# Patient Record
Sex: MALE | Race: WHITE | URBAN - METROPOLITAN AREA
[De-identification: names, ages, dates, MRNs, and addresses within clinical notes are randomized per-mention and may not be internally consistent; named-entity substitution may affect disease eponyms.]

---

## 2017-03-15 ENCOUNTER — OFFICE VISIT (OUTPATIENT)
Dept: FAMILY MEDICINE | Facility: CLINIC | Age: 37
End: 2017-03-15
Payer: COMMERCIAL

## 2017-03-15 ENCOUNTER — OFFICE VISIT (OUTPATIENT)
Dept: BEHAVIORAL HEALTH | Facility: CLINIC | Age: 37
End: 2017-03-15
Payer: COMMERCIAL

## 2017-03-15 VITALS
TEMPERATURE: 98.5 F | HEART RATE: 59 BPM | HEIGHT: 71 IN | BODY MASS INDEX: 24.22 KG/M2 | SYSTOLIC BLOOD PRESSURE: 120 MMHG | DIASTOLIC BLOOD PRESSURE: 71 MMHG | WEIGHT: 173 LBS

## 2017-03-15 DIAGNOSIS — F33.1 MODERATE EPISODE OF RECURRENT MAJOR DEPRESSIVE DISORDER (H): Primary | ICD-10-CM

## 2017-03-15 DIAGNOSIS — F33.1 MAJOR DEPRESSIVE DISORDER, RECURRENT EPISODE, MODERATE (H): Primary | ICD-10-CM

## 2017-03-15 PROCEDURE — 99203 OFFICE O/P NEW LOW 30 MIN: CPT | Performed by: INTERNAL MEDICINE

## 2017-03-15 PROCEDURE — 90791 PSYCH DIAGNOSTIC EVALUATION: CPT | Performed by: SOCIAL WORKER

## 2017-03-15 RX ORDER — LANOLIN ALCOHOL/MO/W.PET/CERES
3 CREAM (GRAM) TOPICAL
COMMUNITY
Start: 2017-03-15

## 2017-03-15 RX ORDER — ESCITALOPRAM OXALATE 10 MG/1
10 TABLET ORAL DAILY
Qty: 90 TABLET | Refills: 1 | Status: SHIPPED | OUTPATIENT
Start: 2017-03-15 | End: 2017-04-12

## 2017-03-15 ASSESSMENT — ANXIETY QUESTIONNAIRES
7. FEELING AFRAID AS IF SOMETHING AWFUL MIGHT HAPPEN: SEVERAL DAYS
2. NOT BEING ABLE TO STOP OR CONTROL WORRYING: MORE THAN HALF THE DAYS
3. WORRYING TOO MUCH ABOUT DIFFERENT THINGS: MORE THAN HALF THE DAYS
IF YOU CHECKED OFF ANY PROBLEMS ON THIS QUESTIONNAIRE, HOW DIFFICULT HAVE THESE PROBLEMS MADE IT FOR YOU TO DO YOUR WORK, TAKE CARE OF THINGS AT HOME, OR GET ALONG WITH OTHER PEOPLE: SOMEWHAT DIFFICULT
GAD7 TOTAL SCORE: 9
1. FEELING NERVOUS, ANXIOUS, OR ON EDGE: SEVERAL DAYS
5. BEING SO RESTLESS THAT IT IS HARD TO SIT STILL: SEVERAL DAYS
6. BECOMING EASILY ANNOYED OR IRRITABLE: SEVERAL DAYS

## 2017-03-15 ASSESSMENT — PATIENT HEALTH QUESTIONNAIRE - PHQ9: 5. POOR APPETITE OR OVEREATING: SEVERAL DAYS
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
## 2017-03-15 NOTE — NURSING NOTE
"Chief Complaint   Patient presents with     Depression     follow up after seeing Margaret San       Initial /71 (BP Location: Right arm, Patient Position: Chair, Cuff Size: Adult Regular)  Pulse 59  Temp 98.5  F (36.9  C) (Tympanic)  Ht 5' 11\" (1.803 m)  Wt 173 lb (78.5 kg)  BMI 24.13 kg/m2 Estimated body mass index is 24.13 kg/(m^2) as calculated from the following:    Height as of this encounter: 5' 11\" (1.803 m).    Weight as of this encounter: 173 lb (78.5 kg).  Medication Reconciliation: complete  "

## 2017-03-15 NOTE — MR AVS SNAPSHOT
After Visit Summary   3/15/2017    Nabeel Beasley    MRN: [id number]           Patient Information     Date Of Birth          1980        Visit Information        Provider Department      3/15/2017 3:00 PM Poornima San LICSW [facility]        Today's Diagnoses     Major depressive disorder, recurrent episode, moderate (H)    -  1       Follow-ups after your visit        Your next 10 appointments already scheduled     Mar 22, 2017  3:00 PM CDT   New Visit with JULY Covarrubias   [facility]    [address] [address]   [phone]            Apr 12, 2017  3:00 PM CDT   Office Visit with Ana Vera DO   [facility]    [address] [address]   [phone]           Bring a current list of meds and any records pertaining to this visit.  For Physicals, please bring immunization records and any forms needing to be filled out.  Please arrive 10 minutes early to complete paperwork.              Who to contact     If you have questions or need follow up information about today's clinic visit or your schedule please contact [facility] directly at [phone].  Normal or non-critical lab and imaging results will be communicated to you by Nuka Indstrieshart, letter or phone within 4 business days after the clinic has received the results. If you do not hear from us within 7 days, please contact the clinic through Nuka Indstrieshart or phone. If you have a critical or abnormal lab result, we will notify you by phone as soon as possible.  Submit refill requests through Spot Coffee or call your pharmacy and they will forward the refill request to us. Please allow 3 business days for your refill to be completed.          Additional Information About Your Visit        Spot Coffee Information     Spot [employer] lets you send messages to your doctor, view your test  "results, renew your prescriptions, schedule appointments and more. To sign up, go to [email or web address] . Click on \"Log in\" on the left side of the screen, which will take you to the Welcome page. Then click on \"Sign up Now\" on the right side of the page.     You will be asked to enter the access code listed below, as well as some personal information. Please follow the directions to create your username and password.     Your access code is: [email or web address]  Expires: 2017  4:32 PM     Your access code will  in 90 days. If you need help or a new code, please call your [facility] clinic or [phone].        Care EveryWhere ID     This is your Care EveryWhere ID. This could be used by other organizations to access your [facility] medical records  [id number]         Blood Pressure from Last 3 Encounters:   03/15/17 120/71    Weight from Last 3 Encounters:   03/15/17 173 lb (78.5 kg)              Today, you had the following     No orders found for display         Today's Medication Changes          These changes are accurate as of: 3/15/17  4:45 PM.  If you have any questions, ask your nurse or doctor.               Start taking these medicines.        Dose/Directions    * escitalopram 10 MG tablet   Commonly known as:  LEXAPRO   Used for:  Situational depression   Started by:  Ana Vera DO        Dose:  10 mg   Take 1 tablet (10 mg) by mouth daily   Quantity:  90 tablet   Refills:  1       * escitalopram 10 MG tablet   Commonly known as:  LEXAPRO   Used for:  Situational depression   Started by:  Ana Vera DO        Dose:  10 mg   Take 1 tablet (10 mg) by mouth daily   Quantity:  90 tablet   Refills:  1       * Notice:  This list has 2 medication(s) that are the same as other medications prescribed for you. Read the directions carefully, and ask your doctor or other care provider to review them with you.         Where to get your medicines      These medications were sent to " [facility] - [place], [place] - [address]  [address], [facility] [address]     Phone:  [phone]     escitalopram 10 MG tablet         These medications were sent to [place], [place] - [address]  [address] [address]     Phone:  [phone]     escitalopram 10 MG tablet                Primary Care Provider Office Phone # Fax #    Yordan Ni [phone]       [facility] [address] [facility] [address]        Thank you!     Thank you for choosing [facility]  for your care. Our goal is always to provide you with excellent care. Hearing back from our patients is one way we can continue to improve our services. Please take a few minutes to complete the written survey that you may receive in the mail after your visit with us. Thank you!             Your Updated Medication List - Protect others around you: Learn how to safely use, store and throw away your medicines at www.disposemymeds.org.          This list is accurate as of: 3/15/17  4:45 PM.  Always use your most recent med list.                   Brand Name Dispense Instructions for use    * escitalopram 10 MG tablet    LEXAPRO    90 tablet    Take 1 tablet (10 mg) by mouth daily       * escitalopram 10 MG tablet    LEXAPRO    90 tablet    Take 1 tablet (10 mg) by mouth daily       melatonin 3 MG tablet      Take 1 tablet (3 mg) by mouth nightly as needed for sleep       * Notice:  This list has 2 medication(s) that are the same as other medications prescribed for you. Read the directions carefully, and ask your doctor or other care provider to review them with you.

## 2017-03-15 NOTE — MR AVS SNAPSHOT
After Visit Summary   3/15/2017    Nabeel Beasley    MRN: [id number]           Patient Information     Date Of Birth          1980        Visit Information        Provider Department      3/15/2017 3:40 PM Ana Vera, DO [facility]        Today's Diagnoses     Situational depression    -  1      Care Instructions          Thank you for choosing [facility].  You may be receiving a survey in the mail from [facility] regarding your visit today.  Please take a few minutes to complete and return the survey to let us know how we are doing.      If you have questions or concerns, please contact us via Factonomy or you can contact your care team at [phone].    Our Clinic hours are:  Monday 6:40 am  to 7:00 pm  Tuesday -Friday 6:40 am to 5:00 pm    The [place] outpatient lab hours are:  Monday - Friday 6:10 am to 4:45 pm  Saturdays 7:00 am to 11:00 am  Appointments are required, call [phone]    If you have clinical questions after hours or would like to schedule an appointment,  call the clinic at [phone].      [facility] [place], [place] - [address]      1. Start lexapro 10 mg once daily.  2. Try Melatonin for sleep  3. See Dr. Vera in about 4 weeks  4. If you have any side effects, let Dr. Vera know.    Sleep Hygiene    1. Avoid doing anything stressful in the hour before bedtime. Avoid paying bills, watching politics on the news, etc.  2. Avoid screens just before bedtime. This includes cell phones, iPad, computers, TV. The bright lights on the screen is very stimulating to the brain, and makes it difficult to follow asleep and stay asleep  3. Avoid alcohol. Alcohol can sometimes make it easier to fall asleep, but significantly reduces the chance that you will stay asleep. It also impairs REM sleep, which is the good restful sleep  4. Use the bed for sleep and sex only. Avoid eating, reading, watching TV in  bed  5. If you feel very restless at bedtime, try doing mundane physical activities such as vacuuming, folding laundry, etc.  6. If you find yourself making lists in your head at night, keep pen and paper at the bedside. Write down these lists. Also visualize yourself checking that item off your list and removing it from your brain.  7. Keep the room cool and dark. Consider investing in late darkening curtains  8. Avoid drinking excessive fluids just before bedtime. Empty your bladder just before bedtime  9. Cognitive behavioral therapy has been proven to be highly effective to treat insomnia. There are several online modules that you can complete for a fee.  Http://shuti.me  Or CBTforInsomnia.com           Follow-ups after your visit        Your next 10 appointments already scheduled     Mar 22, 2017  3:00 PM CDT   New Visit with JULY Covarrubias   [facility]    [address] [address] [phone]              Who to contact     If you have questions or need follow up information about today's clinic visit or your schedule please contact [facility] directly at [phone].  Normal or non-critical lab and imaging results will be communicated to you by MyChart, letter or phone within 4 business days after the clinic has received the results. If you do not hear from us within 7 days, please contact the clinic through Mister Spexhart or phone. If you have a critical or abnormal lab result, we will notify you by phone as soon as possible.  Submit refill requests through Xdynia or call your pharmacy and they will forward the refill request to us. Please allow 3 business days for your refill to be completed.          Additional Information About Your Visit        Mister SpexharMilestone Software Information     Xdynia lets you send messages to your doctor, view your test results, renew your prescriptions, schedule appointments and more. To sign up, go to  "[email or web address] . Click on \"Log in\" on the left side of the screen, which will take you to the Welcome page. Then click on \"Sign up Now\" on the right side of the page.     You will be asked to enter the access code listed below, as well as some personal information. Please follow the directions to create your username and password.     Your access code is: [email or web address]  Expires: 2017  4:32 PM     Your access code will  in 90 days. If you need help or a new code, please call your [facility] clinic or [phone].        Care EveryWhere ID     This is your Care EveryWhere ID. This could be used by other organizations to access your [facility] medical records  [id number]        Your Vitals Were     Pulse Temperature Height BMI (Body Mass Index)          59 98.5  F (36.9  C) (Tympanic) 5' 11\" (1.803 m) 24.13 kg/m2         Blood Pressure from Last 3 Encounters:   03/15/17 120/71    Weight from Last 3 Encounters:   03/15/17 173 lb (78.5 kg)              Today, you had the following     No orders found for display         Today's Medication Changes          These changes are accurate as of: 3/15/17  4:34 PM.  If you have any questions, ask your nurse or doctor.               Start taking these medicines.        Dose/Directions    escitalopram 10 MG tablet   Commonly known as:  LEXAPRO   Used for:  Situational depression   Started by:  Ana Vera,         Dose:  10 mg   Take 1 tablet (10 mg) by mouth daily   Quantity:  90 tablet   Refills:  1            Where to get your medicines      These medications were sent to [facility] - [place], [place] - [address]  [address], [facility] [address]     Phone:  [phone]     escitalopram 10 MG tablet                Primary Care Provider Office Phone # Fax #    Yordan Ni [phone]       [facility] [address]  [facility] [address]        Thank " you!     Thank you for choosing [facility]  for your care. Our goal is always to provide you with excellent care. Hearing back from our patients is one way we can continue to improve our services. Please take a few minutes to complete the written survey that you may receive in the mail after your visit with us. Thank you!             Your Updated Medication List - Protect others around you: Learn how to safely use, store and throw away your medicines at www.disposemymeds.org.          This list is accurate as of: 3/15/17  4:34 PM.  Always use your most recent med list.                   Brand Name Dispense Instructions for use    escitalopram 10 MG tablet    LEXAPRO    90 tablet    Take 1 tablet (10 mg) by mouth daily       melatonin 3 MG tablet      Take 1 tablet (3 mg) by mouth nightly as needed for sleep
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
## 2017-03-15 NOTE — PATIENT INSTRUCTIONS
Thank you for choosing [facility].  You may be receiving a survey in the mail from [employer] regarding your visit today.  Please take a few minutes to complete and return the survey to let us know how we are doing.      If you have questions or concerns, please contact us via [employer] or you can contact your care team at [phone].    Our Clinic hours are:  Monday 6:40 am  to 7:00 pm  Tuesday -Friday 6:40 am to 5:00 pm    The [place] outpatient lab hours are:  Monday - Friday 6:10 am to 4:45 pm  Saturdays 7:00 am to 11:00 am  Appointments are required, call [phone]    If you have clinical questions after hours or would like to schedule an appointment,  call the clinic at [phone].      [facility] [place], [place] - [address]      1. Start lexapro 10 mg once daily.  2. Try Melatonin for sleep  3. See Dr. Vera in about 4 weeks  4. If you have any side effects, let Dr. Vera know.    Sleep Hygiene    1. Avoid doing anything stressful in the hour before bedtime. Avoid paying bills, watching politics on the news, etc.  2. Avoid screens just before bedtime. This includes cell phones, iPad, computers, TV. The bright lights on the screen is very stimulating to the brain, and makes it difficult to follow asleep and stay asleep  3. Avoid alcohol. Alcohol can sometimes make it easier to fall asleep, but significantly reduces the chance that you will stay asleep. It also impairs REM sleep, which is the good restful sleep  4. Use the bed for sleep and sex only. Avoid eating, reading, watching TV in bed  5. If you feel very restless at bedtime, try doing mundane physical activities such as vacuuming, folding laundry, etc.  6. If you find yourself making lists in your head at night, keep pen and paper at the bedside. Write down these lists. Also visualize yourself checking that item off your list and removing it from your brain.  7. Keep the room cool and dark.  Consider investing in late darkening curtains  8. Avoid drinking excessive fluids just before bedtime. Empty your bladder just before bedtime  9. Cognitive behavioral therapy has been proven to be highly effective to treat insomnia. There are several online modules that you can complete for a fee.  Http://shuti.me  Or [email or web address]

## 2017-03-16 ENCOUNTER — CARE COORDINATION (OUTPATIENT)
Dept: CARE COORDINATION | Facility: CLINIC | Age: 37
End: 2017-03-16

## 2017-03-16 ASSESSMENT — PATIENT HEALTH QUESTIONNAIRE - PHQ9: SUM OF ALL RESPONSES TO PHQ QUESTIONS 1-9: 10

## 2017-03-16 ASSESSMENT — ANXIETY QUESTIONNAIRES: GAD7 TOTAL SCORE: 9

## 2017-03-16 NOTE — PROGRESS NOTES
Clinic Care Coordination Contact--late note for 3/15/17  OUTREACH    Referral Information:  Referral Source: Self-patient/Caregiver  Reason for Contact: Pt and his mom called this writer looking for assistance.  SW spoke with pt and was able to make appointments for PCP & C for same day.  Care Conference: No     Universal Utilization:   ED Visits in last year: 0  Hospital visits in last year: 0        Concerns: yes  Multiple Providers or Specialists: no    Clinical Concerns:  Current Medical Concerns: None reported    Current Behavioral Concerns: Depression.  Pt shared he has not been able to work, function in his marriage, and is requesting help    Education Provided to patient: SW and pt discussed the importance of seeing a medial provider and [facility] to assist with his depression.  Pt agrees      Clinical Pathway: None    Medication Management:  Pt has not seen a medical provider for years, thus has no medications     Functional Status:  Mobility Status: Independent  Equipment Currently Used at Home: none  Transportation: Pt has vehicle     Psychosocial:  Current living arrangement: I live in a private home with family (staying at parent's home at this time)  Financial/Insurance: [employer]    Pt struggling with depression.  Crying on the phone, sharing that his mental health is now affecting his marriage.  When asked, pt states he is NOT suicidal/homicidal.  Has no plan or thoughts of SI/HI.  Pt requesting help so that he can get better and be a better father to his children     Resources and Interventions:  Current Resources: Family support      Advanced Care Plans/Directives on file:: No  Referrals Placed: Mental Health     Goals:   Goal 1 Statement: I need some help with my depression  Goal 1 Progression Percent: 40%  Goal 1 Progression Date: 03/15/17  Barriers: Ability to follow through with appointments  Strengths: Pt has very supportive family  Patient/Caregiver understanding: Pt to attend  appointments today with [facility] & establish cares with PCP.       Upcoming appointment: 03/16/17 and 4/12/17     Plan: SW to continue to follow as needed.    Lashawn Weldon  Social Work Care Coordinator  [facility]  [phone]

## 2017-03-22 ENCOUNTER — CARE COORDINATION (OUTPATIENT)
Dept: CARE COORDINATION | Facility: CLINIC | Age: 37
End: 2017-03-22

## 2017-03-22 ENCOUNTER — OFFICE VISIT (OUTPATIENT)
Dept: BEHAVIORAL HEALTH | Facility: CLINIC | Age: 37
End: 2017-03-22
Payer: COMMERCIAL

## 2017-03-22 DIAGNOSIS — F33.1 MAJOR DEPRESSIVE DISORDER, RECURRENT EPISODE, MODERATE (H): Primary | ICD-10-CM

## 2017-03-22 DIAGNOSIS — F43.22 ADJUSTMENT DISORDER WITH ANXIOUS MOOD: ICD-10-CM

## 2017-03-22 PROCEDURE — 90834 PSYTX W PT 45 MINUTES: CPT | Performed by: SOCIAL WORKER

## 2017-03-22 ASSESSMENT — ANXIETY QUESTIONNAIRES
IF YOU CHECKED OFF ANY PROBLEMS ON THIS QUESTIONNAIRE, HOW DIFFICULT HAVE THESE PROBLEMS MADE IT FOR YOU TO DO YOUR WORK, TAKE CARE OF THINGS AT HOME, OR GET ALONG WITH OTHER PEOPLE: SOMEWHAT DIFFICULT
6. BECOMING EASILY ANNOYED OR IRRITABLE: SEVERAL DAYS
2. NOT BEING ABLE TO STOP OR CONTROL WORRYING: SEVERAL DAYS
5. BEING SO RESTLESS THAT IT IS HARD TO SIT STILL: MORE THAN HALF THE DAYS
3. WORRYING TOO MUCH ABOUT DIFFERENT THINGS: SEVERAL DAYS
GAD7 TOTAL SCORE: 8
1. FEELING NERVOUS, ANXIOUS, OR ON EDGE: SEVERAL DAYS
7. FEELING AFRAID AS IF SOMETHING AWFUL MIGHT HAPPEN: NOT AT ALL

## 2017-03-22 ASSESSMENT — PATIENT HEALTH QUESTIONNAIRE - PHQ9: 5. POOR APPETITE OR OVEREATING: MORE THAN HALF THE DAYS

## 2017-03-22 NOTE — MR AVS SNAPSHOT
After Visit Summary   3/22/2017    Nabeel Beasley    MRN: [id number]           Patient Information     Date Of Birth          1980        Visit Information        Provider Department      3/22/2017 3:00 PM Jaswinder PoornimaJUL[facility]        Today's Diagnoses     Major depressive disorder, recurrent episode, moderate (H)    -  1    Adjustment disorder with anxious mood           Follow-ups after your visit        Your next 10 appointments already scheduled     Apr 12, 2017  3:00 PM CDT   [facility] with Ana Vera,    [facility]    [address] [address]   [phone]           Bring a current list of meds and any records pertaining to this visit.  For Physicals, please bring immunization records and any forms needing to be filled out.  Please arrive 10 minutes early to complete paperwork.            Apr 12, 2017  3:30 PM CDT   Return Visit with Poorn[facility] JULY [facility]   [facility]    [address] [address]   [phone]              Who to contact     If you have questions or need follow up information about today's clinic visit or your schedule please contact [facility] directly at [phone].  Normal or non-critical lab and imaging results will be communicated to you by MyChart, letter or phone within 4 business days after the clinic has received the results. If you do not hear from us within 7 days, please contact the clinic through MyChart or phone. If you have a critical or abnormal lab result, we will notify you by phone as soon as possible.  Submit refill requests through [email or web address] or call your pharmacy and they will forward the refill request to us. Please allow 3 business days for your refill to be completed.          Additional Information About Your Visit        MyChart Information     [email or web address] lets you  "send messages to your doctor, view your test results, renew your prescriptions, schedule appointments and more. To sign up, go to [email or web address] . Click on \"Log in\" on the left side of the screen, which will take you to the Welcome page. Then click on \"Sign up Now\" on the right side of the page.     You will be asked to enter the access code listed below, as well as some personal information. Please follow the directions to create your username and password.     Your access code is: [email or web address]  Expires: 2017  4:32 PM     Your access code will  in 90 days. If you need help or a new code, please call your [facility] clinic or [phone].        Care EveryWhere ID     This is your Care EveryWhere ID. This could be used by other organizations to access your [facility] medical records  [id number]         Blood Pressure from Last 3 Encounters:   03/15/17 120/71    Weight from Last 3 Encounters:   03/15/17 173 lb (78.5 kg)              Today, you had the following     No orders found for display       Primary Care Provider Office Phone # Fax #    Yordan Ni [phone]       [address] [address]        Thank you!     Thank you for choosing [facility]  for your care. Our goal is always to provide you with excellent care. Hearing back from our patients is one way we can continue to improve our services. Please take a few minutes to complete the written survey that you may receive in the mail after your visit with us. Thank you!             Your Updated Medication List - Protect others around you: Learn how to safely use, store and throw away your medicines at www.disposemymeds.org.          This list is accurate as of: 3/22/17 11:59 PM.  Always use your most recent med list.                   Brand Name Dispense Instructions for use    * escitalopram 10 MG tablet    LEXAPRO    90 tablet    Take 1 tablet (10 mg) by " mouth daily       * escitalopram 10 MG tablet    LEXAPRO    90 tablet    Take 1 tablet (10 mg) by mouth daily       melatonin 3 MG tablet      Take 1 tablet (3 mg) by mouth nightly as needed for sleep       * Notice:  This list has 2 medication(s) that are the same as other medications prescribed for you. Read the directions carefully, and ask your doctor or other care provider to review them with you.

## 2017-03-22 NOTE — PROGRESS NOTES
Clinic Care Coordination Contact  [facility]/Voicemail    Referral Source: Self-patient/Caregiver  Clinical Data: Care Coordinator Outreach  Outreach attempted x 1.  Left message on voicemail with call back information and requested return call.  Plan: Care Coordinator will mail out care coordination introduction letter with care coordinator contact information and explanation of care coordination services. Care Coordinator will try to reach patient again in 3-5 business days.    Per [facility], pt checked into an appointment with ABBIE Dacosta at 2:42 PM.    Lashawn Weldon  Social Work Care Coordinator  [facility]  [phone]

## 2017-03-31 NOTE — PROGRESS NOTES
[facility]  March 22, 2017      Behavioral Health Clinician Progress Note    Patient Name: Nabeel Beasley           Service Type: Individual      Service Location:  in clinic      Session Start Time: 255 pm  Session End Time: 345 pm      Session Length: 38 - 52      Attendees: Client    Visit Activities (Refresh list every visit): [facility] Only    Diagnostic Assessment Date: 3-  Treatment Plan Review Date: not completed  See Flowsheets for today's PHQ-9 and TETE-7 results  Previous PHQ-9:   PHQ-9 SCORE 3/15/2017 3/22/2017   Total Score 10 10     Previous TETE-7:   TETE-7 SCORE 3/15/2017 3/22/2017   Total Score 9 8       LEILA LEVEL:  LEILA Score (Last Two) 3/15/2017   LEILA Raw Score 21   Activation Score 35.4   LEILA Level 1       DATA  Extended Session (60+ minutes): No  Interactive Complexity: No  Crisis: No    Treatment Objective(s) Addressed in This Session:  Target Behavior(s): disease management/lifestyle changes decrease anxious response to events, decrease depressed mood de    Depressed Mood: Increase interest, engagement, and pleasure in doing things  Decrease frequency and intensity of feeling down, depressed, hopeless  Feel less tired and more energy during the day   Identify negative self-talk and behaviors: challenge core beliefs, myths, and actions  Improve concentration, focus, and mindfulness in daily activities   Adjustment Difficulties: will develop coping/problem-solving skills to facilitate more adaptive adjustment  Relationship Problems: will address relationship difficulties in a more adaptive manner  Grief / Loss: will increase understanding of normal grieving process, will engage in effective approach to address and resolve grief/loss issues and will process grief/loss issues in an adaptive manner    Current Stressors / Issues:  Pt reports experiencing panic attacks at work this week. He ruminates about his marital stressors and this leads to increased panic.  Discussed  trying to distract from this as much as possible. Practiced mindfulness of breath exercise and mindfulness of thoughts.  Also discussed self-care - eating healthy, being around supportive people. Also discussed his response to possible loss of relationship - similar to death of loved one. Pt will continue to monitor his mood and take medications as directed. He will see writer in two weeks or sooner if needed.       Progress on Treatment Objective(s) / Homework:  treatment objectives not established    Motivational Interviewing    MI Intervention: Expressed Empathy/Understanding, Supported Autonomy, Collaboration, Evocation, Open-ended questions, Reflections: simple and complex and Reframe     Change Talk Expressed by the Patient: Desire to change Ability to change Reasons to change Need to change Committment to change    Provider Response to Change Talk: E - Evoked more info from patient about behavior change, A - Affirmed patient's thoughts, decisions, or attempts at behavior change, R - Reflected patient's change talk and S - Summarized patient's change talk statements      Care Plan review completed: No    Medication Review:  No changes to current psychiatric medication(s)    Medication Compliance:  Yes    Changes in Health Issues:   None reported    Chemical Use Review:   Substance Use: Chemical use reviewed, no active concerns identified      Tobacco Use: No current tobacco use.      Assessment: Current Emotional / Mental Status (status of significant symptoms):  Risk status (Self / Other harm or suicidal ideation)  Patient denies a history of suicidal ideation, suicide attempts, self-injurious behavior, homicidal ideation, homicidal behavior and and other safety concerns  Patient denies current fears or concerns for personal safety.  Patient denies current or recent suicidal ideation or behaviors.  Patient denies current or recent homicidal ideation or behaviors.  Patient denies current or recent self  injurious behavior or ideation.  Patient denies other safety concerns.  A safety and risk management plan has not been developed at this time, however patient was encouraged to call [place] [phone] should there be a change in any of these risk factors.    Appearance:   Appropriate   Eye Contact:   Good   Psychomotor Behavior: Restless   Attitude:   Cooperative   Orientation:   All  Speech   Rate / Production: Normal    Volume:  Normal   Mood:    Angry  Anxious  Sad  appropriate to events  Affect:    Appropriate  Worrisome   Thought Content:  Clear   Thought Form:  Coherent  Logical   Insight:    Good     Diagnoses:    1. Major depressive disorder, recurrent episode, moderate (H)    2. Adjustment disorder with anxious mood    A. The development of emotional or behavioral symptoms in response to an identifiable stressor(s) occurring within 3 months of the onset of the stressor(s)  B. These symptoms or behaviors are clinically significant, as evidenced by one or both of the following:       - Significant impairment in social, occupational, or other important areas of functioning  C. The stress-related disturbance does not meet criteria for another disorder & is not not an exacerbation of another mental disorder  D. The symptoms do not represent normal bereavement  E. Once the stressor or its consequences have terminated, the symptoms do not persist for more than an additional 6 months       * Adjustment Disorder with Anxiety: The predominant manfestations are symptoms such as nervousness, worry, or jitteriness, or, in children separation anxiety from major attachment figures      Collateral Reports Completed:  Communicated with: PCP    Plan: (Homework, other):  Patient was given information about behavioral services and encouraged to schedule a follow up appointment with the clinic [facility] in 2 weeks.  He was also given deep Breathing Strategies to practice when experiencing anxiety and depression.  CD Recommendations:  No indications of CD issues. [facility], [facility]

## 2017-04-01 ASSESSMENT — ANXIETY QUESTIONNAIRES: GAD7 TOTAL SCORE: 8

## 2017-04-01 ASSESSMENT — PATIENT HEALTH QUESTIONNAIRE - PHQ9: SUM OF ALL RESPONSES TO PHQ QUESTIONS 1-9: 10

## 2017-04-07 ENCOUNTER — CARE COORDINATION (OUTPATIENT)
Dept: CARE COORDINATION | Facility: CLINIC | Age: 37
End: 2017-04-07

## 2017-04-07 NOTE — PROGRESS NOTES
Clinic Care Coordination Contact  [facility]/Voicemail    Referral Source: Self-patient/Caregiver  Clinical Data: Care Coordinator Outreach  Outreach attempted x 2.  SW left message with pt's mom and requested a return call.  Pt is at work at this time.   Plan: Care Coordinator will mail out care coordination introduction letter with care coordinator contact information and explanation of care coordination services in today's mail.. Care Coordinator will try to reach patient again in 5-10 business days.    Lashawn Weldon  Social Work Care Coordinator  [facility]  [phone]

## 2017-04-07 NOTE — LETTER
[facility]  [address] [address]      April 7, 2017      Nabeel Beasley  [address] [address]    Jamie Lees,  This is Lashawn, the Clinic Care Coordinator-, that works with Ana Vera's [facility] clinic. I recently tried to call your phone and was unable to reach you, but I wanted to touch base and see how you are doing.  Below is a description of what Clinic Care Coordination is and how I can further assist you.     The Clinic Care Coordinator role is a Registered Nurse and/or  who understands the health care system. The goal of Clinic Care Coordination is to help you manage your health and improve access to the [facility] system in the most efficient manner.  The Registered Nurse can assist you in meeting your health care goals by providing education, coordinating services, and strengthening the communication among your providers. The  can assist you with financial, behavioral, psychosocial, and chemical dependency and counseling/psychiatric resources.    Please feel free to keep this letter and contact information to contact me at [phone] with any further questions or concerns that may arise. We at [facility] are focused on providing you with the highest-quality healthcare experience possible and that all starts with you.       Sincerely,     Lashawn Weldon  Social Work Care Coordinator  [facility]  [phone]

## 2017-04-12 ENCOUNTER — OFFICE VISIT (OUTPATIENT)
Dept: FAMILY MEDICINE | Facility: CLINIC | Age: 37
End: 2017-04-12
Payer: COMMERCIAL

## 2017-04-12 ENCOUNTER — OFFICE VISIT (OUTPATIENT)
Dept: BEHAVIORAL HEALTH | Facility: CLINIC | Age: 37
End: 2017-04-12
Payer: COMMERCIAL

## 2017-04-12 VITALS
HEIGHT: 71 IN | WEIGHT: 174 LBS | HEART RATE: 64 BPM | TEMPERATURE: 97.3 F | SYSTOLIC BLOOD PRESSURE: 132 MMHG | DIASTOLIC BLOOD PRESSURE: 68 MMHG | BODY MASS INDEX: 24.36 KG/M2

## 2017-04-12 DIAGNOSIS — F33.1 MAJOR DEPRESSIVE DISORDER, RECURRENT EPISODE, MODERATE (H): Primary | ICD-10-CM

## 2017-04-12 DIAGNOSIS — F43.22 ADJUSTMENT DISORDER WITH ANXIOUS MOOD: ICD-10-CM

## 2017-04-12 DIAGNOSIS — F33.1 MODERATE EPISODE OF RECURRENT MAJOR DEPRESSIVE DISORDER (H): Primary | ICD-10-CM

## 2017-04-12 PROCEDURE — 90834 PSYTX W PT 45 MINUTES: CPT | Performed by: SOCIAL WORKER

## 2017-04-12 PROCEDURE — 99214 OFFICE O/P EST MOD 30 MIN: CPT | Performed by: INTERNAL MEDICINE

## 2017-04-12 RX ORDER — ESCITALOPRAM OXALATE 20 MG/1
20 TABLET ORAL DAILY
Qty: 90 TABLET | Refills: 3 | Status: SHIPPED | OUTPATIENT
Start: 2017-04-12

## 2017-04-12 ASSESSMENT — ANXIETY QUESTIONNAIRES
GAD7 TOTAL SCORE: 6
7. FEELING AFRAID AS IF SOMETHING AWFUL MIGHT HAPPEN: NOT AT ALL
1. FEELING NERVOUS, ANXIOUS, OR ON EDGE: SEVERAL DAYS
2. NOT BEING ABLE TO STOP OR CONTROL WORRYING: SEVERAL DAYS
6. BECOMING EASILY ANNOYED OR IRRITABLE: SEVERAL DAYS
5. BEING SO RESTLESS THAT IT IS HARD TO SIT STILL: SEVERAL DAYS
3. WORRYING TOO MUCH ABOUT DIFFERENT THINGS: SEVERAL DAYS

## 2017-04-12 ASSESSMENT — PATIENT HEALTH QUESTIONNAIRE - PHQ9: 5. POOR APPETITE OR OVEREATING: SEVERAL DAYS

## 2017-04-12 NOTE — NURSING NOTE
"Chief Complaint   Patient presents with     Depression     recheck and then sees Margaret after this appointment.  Needs to check in at  for Margaret.       Initial /76 (BP Location: Right arm, Patient Position: Chair, Cuff Size: Adult Regular)  Pulse 75  Temp 97.3  F (36.3  C) (Tympanic)  Ht 5' 11\" (1.803 m)  Wt 174 lb (78.9 kg)  BMI 24.27 kg/m2 Estimated body mass index is 24.27 kg/(m^2) as calculated from the following:    Height as of this encounter: 5' 11\" (1.803 m).    Weight as of this encounter: 174 lb (78.9 kg).  Medication Reconciliation: complete  "

## 2017-04-12 NOTE — MR AVS SNAPSHOT
"              After Visit Summary   4/12/2017    Nabeel Beasley    MRN: [id number]           Patient Information     Date Of Birth          1980        Visit Information        Provider Department      4/12/2017 3:30 PM [facility]        Today's Diagnoses     Major depressive disorder, recurrent episode, moderate (H)    -  1    Adjustment disorder with anxious mood           Follow-ups after your visit        Your next 10 appointments already scheduled     May 17, 2017  3:00 PM CDT   SHORT with Ana Vera,    [facility]    [address] [address]   [phone]            May 17, 2017  3:30 PM CDT   Return Visit with JULY Covarrubias   [facility]    [address] [address]   [phone]              Who to contact     If you have questions or need follow up information about today's clinic visit or your schedule please contact [facility] directly at [phone].  Normal or non-critical lab and imaging results will be communicated to you by MyChart, letter or phone within 4 business days after the clinic has received the results. If you do not hear from us within 7 days, please contact the clinic through [facility] or phone. If you have a critical or abnormal lab result, we will notify you by phone as soon as possible.  Submit refill requests through [facility] or call your pharmacy and they will forward the refill request to us. Please allow 3 business days for your refill to be completed.          Additional Information About Your Visit        MyChart Information     [facility] lets you send messages to your doctor, view your test results, renew your prescriptions, schedule appointments and more. To sign up, go to [email or web address] . Click on \"Log in\" on the left side of the screen, which will take you to the " "Welcome page. Then click on \"Sign up Now\" on the right side of the page.     You will be asked to enter the access code listed below, as well as some personal information. Please follow the directions to create your username and password.     Your access code is: [email or web address]  Expires: 2017  4:32 PM     Your access code will  in 90 days. If you need help or a new code, please call your [facility] clinic or [phone].        Care EveryWhere ID     This is your Care EveryWhere ID. This could be used by other organizations to access your [facility] medical records  [id number]         Blood Pressure from Last 3 Encounters:   17 132/68   03/15/17 120/71    Weight from Last 3 Encounters:   17 174 lb (78.9 kg)   03/15/17 173 lb (78.5 kg)              Today, you had the following     No orders found for display         Today's Medication Changes          These changes are accurate as of: 17 11:59 PM.  If you have any questions, ask your nurse or doctor.               These medicines have changed or have updated prescriptions.        Dose/Directions    escitalopram 20 MG tablet   Commonly known as:  LEXAPRO   This may have changed:    - medication strength  - how much to take   Used for:  Moderate episode of recurrent major depressive disorder (H)   Changed by:  Ana Vera DO        Dose:  20 mg   Take 1 tablet (20 mg) by mouth daily   Quantity:  90 tablet   Refills:  3            Where to get your medicines      These medications were sent to [facility] - [place], [place] - [address]  [address], [facility] [address]     Phone:  [phone]     escitalopram 20 MG tablet                Primary Care Provider Office Phone # Fax #    Ana Vera [phone]       [facility] [address] [address]        Thank you!     Thank you for choosing [facility]  for your care. " Our goal is always to provide you with excellent care. Hearing back from our patients is one way we can continue to improve our services. Please take a few minutes to complete the written survey that you may receive in the mail after your visit with us. Thank you!             Your Updated Medication List - Protect others around you: Learn how to safely use, store and throw away your medicines at www.disposemymeds.org.          This list is accurate as of: 4/12/17 11:59 PM.  Always use your most recent med list.                   Brand Name Dispense Instructions for use    escitalopram 20 MG tablet    LEXAPRO    90 tablet    Take 1 tablet (20 mg) by mouth daily       melatonin 3 MG tablet      Take 1 tablet (3 mg) by mouth nightly as needed for sleep

## 2017-04-12 NOTE — PATIENT INSTRUCTIONS
1. Increase lexapro to 20 mg once daily.  Call pharmacy to fill  2. See Dr. Vera in 1-2 months, sooner if needed.  3. Continue to see Margaret

## 2017-04-12 NOTE — PROGRESS NOTES
SUBJECTIVE:                                                    Nabeel Beasley is a 36 year old male who presents to clinic today for the following health issues:        Depression and Anxiety Follow-Up    Status since last visit: Improved head is clearer but still just blah.  Less angry.  At night feels like wears off towards night and uses melatonin at night but still hard.     Other associated symptoms:still kind of hard to focus now and then    Complicating factors:     Significant life event: Yes-  Going through a separation     Current substance abuse: None    PHQ-9 SCORE 3/15/2017 3/22/2017 4/12/2017   Total Score 10 10 10     TETE-7 SCORE 3/15/2017 3/22/2017 4/12/2017   Total Score 9 8 6        PHQ-9  English      PHQ-9   Any Language     GAD7       Amount of exercise or physical activity: None    Problems taking medications regularly: No at night gets a tingling feeling in back of head.      NONE    Medication side effects: none    Diet: regular (no restrictions)    Chief Complaint   Patient presents with     Depression     recheck and then sees Margaret after this appointment.  Needs to check in at  for Margaret.   I saw patient on 3/15.  We started lexapro and melatonin.  He feels his appetite is improving, sleep is mildly improving.  Still endorsing significant anhedonia.  Anger has markedly improved.  He thinks the dose is wearing off at night, wonders if this is contributing to sleep troubles.  Taking pill at 6 AM.  Reports sexual dysfunction with low sex drive.  His mood improves when he is busy with work or when he spends time with his children  Works in construction.          Current Outpatient Prescriptions   Medication Sig Dispense Refill     escitalopram (LEXAPRO) 20 MG tablet Take 1 tablet (20 mg) by mouth daily 90 tablet 3     melatonin 3 MG tablet Take 1 tablet (3 mg) by mouth nightly as needed for sleep       [DISCONTINUED] escitalopram (LEXAPRO) 10 MG tablet Take 1 tablet (10 mg) by  "mouth daily 90 tablet 1     [DISCONTINUED] escitalopram (LEXAPRO) 10 MG tablet Take 1 tablet (10 mg) by mouth daily 90 tablet 1       Reviewed and updated as needed this visit by clinical staff  Tobacco  Allergies  Meds  Problems       Reviewed and updated as needed this visit by Provider  Allergies  Meds  Problems         ROS:  Constitutional, HEENT, cardiovascular, pulmonary, gi and gu systems are negative, except as otherwise noted.    OBJECTIVE:                                                    /68  Pulse 64  Temp 97.3  F (36.3  C) (Tympanic)  Ht 5' 11\" (1.803 m)  Wt 174 lb (78.9 kg)  BMI 24.27 kg/m2  Body mass index is 24.27 kg/(m^2).  GENERAL APPEARANCE: healthy, alert and no distress  PSYCH: Flat affect, no longer is tearful or with such significant flat affect. Patient was able to smile and laugh for a brief period       ASSESSMENT/PLAN:                                                      1. Moderate episode of recurrent major depressive disorder (H) - improved but still not controlled. Increase dose after discussion of risks and benefits. Continue see counselor. Discussed that sexual dysfunction is likely a physical symptom of his depression.  Return to clinic 1-2 months, sooner if needed  - escitalopram (LEXAPRO) 20 MG tablet; Take 1 tablet (20 mg) by mouth daily  Dispense: 90 tablet; Refill: 3      Ana Vera,   [facility]  "

## 2017-04-12 NOTE — MR AVS SNAPSHOT
"              After Visit Summary   4/12/2017    Nabeel Beasley    MRN: [id number]           Patient Information     Date Of Birth          1980        Visit Information        Provider Department      4/12/2017 3:00 PM Ana Vera,  [facility]        Today's Diagnoses     Moderate episode of recurrent major depressive disorder (H)    -  1      Care Instructions    1. Increase lexapro to 20 mg once daily.  Call pharmacy to fill  2. See Dr. Vera in 1-2 months, sooner if needed.  3. Continue to see Margaret          Follow-ups after your visit        Who to contact     If you have questions or need follow up information about today's clinic visit or your schedule please contact [facility] directly at [phone].  Normal or non-critical lab and imaging results will be communicated to you by MyChart, letter or phone within 4 business days after the clinic has received the results. If you do not hear from us within 7 days, please contact the clinic through MyChart or phone. If you have a critical or abnormal lab result, we will notify you by phone as soon as possible.  Submit refill requests through [facility] or call your pharmacy and they will forward the refill request to us. Please allow 3 business days for your refill to be completed.          Additional Information About Your Visit        MyChart Information     [facility] lets you send messages to your doctor, view your test results, renew your prescriptions, schedule appointments and more. To sign up, go to [email or web address] . Click on \"Log in\" on the left side of the screen, which will take you to the Welcome page. Then click on \"Sign up Now\" on the right side of the page.     You will be asked to enter the access code listed below, as well as some personal information. Please follow the directions to create your username and password.     Your access code is: [email or web address]  Expires: 6/13/2017  4:32 PM     Your " "access code will  in 90 days. If you need help or a new code, please call your [facility] clinic or [phone].        Care EveryWhere ID     This is your Care EveryWhere ID. This could be used by other organizations to access your [facility] medical records  [id number]        Your Vitals Were     Pulse Temperature Height BMI (Body Mass Index)          75 97.3  F (36.3  C) (Tympanic) 5' 11\" (1.803 m) 24.27 kg/m2         Blood Pressure from Last 3 Encounters:   17 141/76   03/15/17 120/71    Weight from Last 3 Encounters:   17 174 lb (78.9 kg)   03/15/17 173 lb (78.5 kg)              Today, you had the following     No orders found for display         Today's Medication Changes          These changes are accurate as of: 17  3:35 PM.  If you have any questions, ask your nurse or doctor.               These medicines have changed or have updated prescriptions.        Dose/Directions    escitalopram 20 MG tablet   Commonly known as:  LEXAPRO   This may have changed:    - medication strength  - how much to take   Used for:  Moderate episode of recurrent major depressive disorder (H)   Changed by:  Ana Vera,         Dose:  20 mg   Take 1 tablet (20 mg) by mouth daily   Quantity:  90 tablet   Refills:  3            Where to get your medicines      These medications were sent to [facility] - [place], [place] - [address]  [address], [facility] [address]     Phone:  [phone]     escitalopram 20 MG tablet                Primary Care Provider Office Phone # Fax #    Yordan Ni [phone]       [facility] [address] [facility] [address]        Thank you!     Thank you for choosing [facility]  for your care. Our goal is always to provide you with excellent care. Hearing back from our patients is one way we can continue to improve our services. Please take a few minutes " to complete the written survey that you may receive in the mail after your visit with us. Thank you!             Your Updated Medication List - Protect others around you: Learn how to safely use, store and throw away your medicines at www.disposemymeds.org.          This list is accurate as of: 4/12/17  3:35 PM.  Always use your most recent med list.                   Brand Name Dispense Instructions for use    escitalopram 20 MG tablet    LEXAPRO    90 tablet    Take 1 tablet (20 mg) by mouth daily       melatonin 3 MG tablet      Take 1 tablet (3 mg) by mouth nightly as needed for sleep

## 2017-04-13 ASSESSMENT — ANXIETY QUESTIONNAIRES: GAD7 TOTAL SCORE: 6

## 2017-04-13 ASSESSMENT — PATIENT HEALTH QUESTIONNAIRE - PHQ9: SUM OF ALL RESPONSES TO PHQ QUESTIONS 1-9: 10

## 2017-04-19 NOTE — PROGRESS NOTES
[facility]  April 12 2017      Behavioral Health Clinician Progress Note    Patient Name: Nabeel Beasley           Service Type: Individual      Service Location:  in clinic      Session Start Time: 340 pm  Session End Time: 435 pm      Session Length: 38 - 52      Attendees: Client    Visit Activities (Refresh list every visit): [facility] Only    Diagnostic Assessment Date: 3-  Treatment Plan Review Date 7/12/2017   See Flowsheets for today's PHQ-9 and TETE-7 results  Previous PHQ-9:   PHQ-9 SCORE 3/15/2017 3/22/2017 4/12/2017   Total Score 10 10 10     Previous TETE-7:   TETE-7 SCORE 3/15/2017 3/22/2017 4/12/2017   Total Score 9 8 6       LEILA LEVEL:  LEILA Score (Last Two) 3/15/2017   LEILA Raw Score 21   Activation Score 35.4   LEILA Level 1       DATA  Extended Session (60+ minutes): No  Interactive Complexity: No  Crisis: No    Treatment Objective(s) Addressed in This Session:  Target Behavior(s): disease management/lifestyle changes decrease anxious response to events, decrease depressed mood de    Depressed Mood: Increase interest, engagement, and pleasure in doing things  Decrease frequency and intensity of feeling down, depressed, hopeless  Feel less tired and more energy during the day   Identify negative self-talk and behaviors: challenge core beliefs, myths, and actions  Improve concentration, focus, and mindfulness in daily activities   Adjustment Difficulties: will develop coping/problem-solving skills to facilitate more adaptive adjustment  Relationship Problems: will address relationship difficulties in a more adaptive manner  Grief / Loss: will increase understanding of normal grieving process, will engage in effective approach to address and resolve grief/loss issues and will process grief/loss issues in an adaptive manner    Current Stressors / Issues:  Pt continues to experience panic attacks -although notices they are less frequent and less intense. He continues to  ruminate about his marital stressors and this leads to increased panic. Practiced mindfulness in session.  Also practiced relaxation exercises.  Reviewed  self-care - eating healthy, being around supportive people. Discussed the process of grief and patient recognizes where he is in this process. Pt will continue to monitor his mood and take medications as directed. He will see writer in 1 month or sooner if needed.       Progress on Treatment Objective(s) / Homework:  New Objective established this session - ACTION (Actively working towards change); Intervened by reinforcing change plan / affirming steps taken    Motivational Interviewing    MI Intervention: Expressed Empathy/Understanding, Supported Autonomy, Collaboration, Evocation, Open-ended questions, Reflections: simple and complex and Reframe     Change Talk Expressed by the Patient: Desire to change Ability to change Reasons to change Need to change Committment to change    Provider Response to Change Talk: E - Evoked more info from patient about behavior change, A - Affirmed patient's thoughts, decisions, or attempts at behavior change, R - Reflected patient's change talk and S - Summarized patient's change talk statements      Care Plan review completed:Guests   Medication Review:  No changes to current psychiatric medication(s)    Medication Compliance:  Yes    Changes in Health Issues:   None reported    Chemical Use Review:   Substance Use: Chemical use reviewed, no active concerns identified      Tobacco Use: No current tobacco use.      Assessment: Current Emotional / Mental Status (status of significant symptoms):  Risk status (Self / Other harm or suicidal ideation)  Patient denies a history of suicidal ideation, suicide attempts, self-injurious behavior, homicidal ideation, homicidal behavior and and other safety concerns  Patient denies current fears or concerns for personal safety.  Patient denies current or recent suicidal ideation or  behaviors.  Patient denies current or recent homicidal ideation or behaviors.  Patient denies current or recent self injurious behavior or ideation.  Patient denies other safety concerns.  A safety and risk management plan has not been developed at this time, however patient was encouraged to call [place] [phone] should there be a change in any of these risk factors.    Appearance:   Appropriate   Eye Contact:   Good   Psychomotor Behavior: Restless   Attitude:   Cooperative   Orientation:   All  Speech   Rate / Production: Normal    Volume:  Normal   Mood:    Anxious  Sad  appropriate to events  Affect:    Appropriate  Worrisome  less than last session   Thought Content:  Clear   Thought Form:  Coherent  Logical   Insight:    Good     Diagnoses:    1. Major depressive disorder, recurrent episode, moderate (H)    2. Adjustment disorder with anxious mood        Collateral Reports Completed:  Communicated with: PCP    Plan: (Homework, other):  Patient was given information about behavioral services and encouraged to schedule a follow up appointment with the clinic [facility] in 2 weeks.  He was also given deep Breathing Strategies to practice when experiencing anxiety and depression.  CD Recommendations: No indications of CD issues. [facility], [facility], [facility]        ______________________________________________________________________    Integrated Primary Care Behavioral Health Treatment Plan    Patient's Name: Nabeel Beasley  YOB: 1980    Date: April 12, 2017    DSM-V Diagnoses: 296.32 Major Depressive Disorder, Recurrent Episode, Moderate _ and With melancholic features or Adjustment Disorders  309.24 (F43.22) With anxiety  Psychosocial / Contextual Factors:  Recent separation from spouse   WHODAS: 31    Referral / Collaboration:  The following referral(s) was/were discussed but client declines follow up at this time. Continue to assess as needed.    Anticipated number of session or this episode  of care: 5-8  MeasurableTreatment Goal(s) related to diagnosis / functional impairment(s)  Goal 1: Patient will decrease anxiety and depression by 50% as indicated by PHQ9, TETE 7 and self report.    I will know I've met my goal when I'm moving on with my life.      Objective #A (Patient Action)    Patient will identify 3 stressors which contribute to feelings of anxiety.  Status: New - Date: 4/12/2017     Intervention(s)  [facility] will assign homework As discussed in session  teach distraction skills. Along with relaxation skills.    Objective #B  Patient will Increase interest, engagement, and pleasure in doing things  Decrease frequency and intensity of feeling down, depressed, hopeless  Feel less tired and more energy during the day   Identify negative self-talk and behaviors: challenge core beliefs, myths, and actions  Improve concentration, focus, and mindfulness in daily activities .  Status: New - Date: 4/12/2017     Intervention(s)  [facility] will assign homework As discussed in session  teach emotional regulation skills. Recognizes thoughts and events that lead to depressed thoughts and mood.    Patient has reviewed and agreed to the above plan.    Written by  [facility], [facility]

## 2017-05-11 ENCOUNTER — CARE COORDINATION (OUTPATIENT)
Dept: CARE COORDINATION | Facility: CLINIC | Age: 37
End: 2017-05-11

## 2017-05-11 NOTE — PROGRESS NOTES
Clinic Care Coordination Contact  [facility]/Voicemail    Referral Source: Self-patient/Caregiver  Clinical Data: Care Coordinator Outreach  Outreach attempted x 3.  Left message on voicemail with call back information and requested return call.  Plan: Care Coordinator mailed out care coordination introduction letter on 4/7/17. Care Coordinator will do no further outreaches at this time.    Lashawn Weldon  Social Work Care Coordinator  [facility]  [phone]

## 2017-06-05 ENCOUNTER — OFFICE VISIT (OUTPATIENT)
Dept: FAMILY MEDICINE | Facility: CLINIC | Age: 37
End: 2017-06-05
Payer: COMMERCIAL

## 2017-06-05 VITALS
HEIGHT: 71 IN | TEMPERATURE: 98 F | HEART RATE: 69 BPM | WEIGHT: 173 LBS | BODY MASS INDEX: 24.22 KG/M2 | SYSTOLIC BLOOD PRESSURE: 123 MMHG | DIASTOLIC BLOOD PRESSURE: 83 MMHG

## 2017-06-05 DIAGNOSIS — M25.562 ACUTE PAIN OF LEFT KNEE: Primary | ICD-10-CM

## 2017-06-05 PROCEDURE — 99213 OFFICE O/P EST LOW 20 MIN: CPT | Performed by: PHYSICIAN ASSISTANT

## 2017-06-05 RX ORDER — PREDNISONE 20 MG/1
40 TABLET ORAL DAILY
Qty: 10 TABLET | Refills: 0 | Status: SHIPPED | OUTPATIENT
Start: 2017-06-05 | End: 2017-06-10

## 2017-06-05 ASSESSMENT — PAIN SCALES - GENERAL: PAINLEVEL: NO PAIN (1)

## 2017-06-05 NOTE — NURSING NOTE
"Chief Complaint   Patient presents with     Musculoskeletal Problem       Initial /83 (BP Location: Right arm, Patient Position: Chair, Cuff Size: Adult Regular)  Pulse 69  Temp 98  F (36.7  C) (Oral)  Ht 5' 11\" (1.803 m)  Wt 173 lb (78.5 kg)  BMI 24.13 kg/m2 Estimated body mass index is 24.13 kg/(m^2) as calculated from the following:    Height as of this encounter: 5' 11\" (1.803 m).    Weight as of this encounter: 173 lb (78.5 kg).  Medication Reconciliation: complete   Yanni Son CMA       "

## 2017-06-05 NOTE — MR AVS SNAPSHOT
After Visit Summary   6/5/2017    Nabeel Beasley    MRN: [id number]           Patient Information     Date Of Birth          1980        Visit Information        Provider Department      6/5/2017 10:20 AM Amalia Wbeer PA-C [facility]        Today's Diagnoses     Acute pain of left knee    -  1      Care Instructions    Use the prednisone, elevated and ice  Wear the brace   Follow up with ortho if not getting better          Follow-ups after your visit        Additional Services     ORTHO  REFERRAL       [facility] is referring you to the Orthopedic  Services at [facility].       The  Representative will assist you in the coordination of your Orthopedic and Musculoskeletal Care as prescribed by your physician.    The  Representative will call you within 1 business day to help schedule your appointment, or you may contact the  Representative at:    All areas ~ [phone]     Type of Referral : Non Surgical       Timeframe requested: Routine    Coverage of these services is subject to the terms and limitations of your health insurance plan.  Please call member services at your health plan with any benefit or coverage questions.      If X-rays, CT or MRI's have been performed, please contact the facility where they were done to arrange for , prior to your scheduled appointment.  Please bring this referral request to your appointment and present it to your specialist.                  Who to contact     If you have questions or need follow up information about today's clinic visit or your schedule please contact [facility] directly at [phone].  Normal or non-critical lab and imaging results will be communicated to you by MyChart, letter or phone within 4 business days after the clinic has received the results. If you do not hear from us within 7  "days, please contact the clinic through [employer] or phone. If you have a critical or abnormal lab result, we will notify you by phone as soon as possible.  Submit refill requests through [employer] or call your pharmacy and they will forward the refill request to us. Please allow 3 business days for your refill to be completed.          Additional Information About Your Visit        [employer] Information     [employer] lets you send messages to your doctor, view your test results, renew your prescriptions, schedule appointments and more. To sign up, go to [email or web address] . Click on \"Log in\" on the left side of the screen, which will take you to the Welcome page. Then click on \"Sign up Now\" on the right side of the page.     You will be asked to enter the access code listed below, as well as some personal information. Please follow the directions to create your username and password.     Your access code is: [email or web address]  Expires: 2017  4:32 PM     Your access code will  in 90 days. If you need help or a new code, please call your [place] clinic or [phone].        Care EveryWhere ID     This is your Care EveryWhere ID. This could be used by other organizations to access your [place] medical records  [id number]        Your Vitals Were     Pulse Temperature Height BMI (Body Mass Index)          69 98  F (36.7  C) (Oral) 5' 11\" (1.803 m) 24.13 kg/m2         Blood Pressure from Last 3 Encounters:   17 123/83   17 132/68   03/15/17 120/71    Weight from Last 3 Encounters:   17 173 lb (78.5 kg)   17 174 lb (78.9 kg)   03/15/17 173 lb (78.5 kg)              We Performed the Following     ORTHO  REFERRAL          Today's Medication Changes          These changes are accurate as of: 17 11:06 AM.  If you have any questions, ask your nurse or doctor.               Start taking these medicines.        Dose/Directions    order for DME   Used for:  Acute pain of left knee "   Started by:  Amalia Weber PA-C        Left knee brace   Quantity:  1 each   Refills:  0       predniSONE 20 MG tablet   Commonly known as:  DELTASONE   Used for:  Acute pain of left knee   Started by:  Amalia Weber PA-C        Dose:  40 mg   Take 2 tablets (40 mg) by mouth daily for 5 days   Quantity:  10 tablet   Refills:  0            Where to get your medicines      These medications were sent to [facility] [place], [place] - [address]  [address], [facility] [address]     Phone:  [phone]     predniSONE 20 MG tablet         Some of these will need a paper prescription and others can be bought over the counter.  Ask your nurse if you have questions.     Bring a paper prescription for each of these medications     order for DME                Primary Care Provider Office Phone # Fax #    Ana VeraDO [phone]       [facility] [address] [facility] [address]        Thank you!     Thank you for choosing [facility]  for your care. Our goal is always to provide you with excellent care. Hearing back from our patients is one way we can continue to improve our services. Please take a few minutes to complete the written survey that you may receive in the mail after your visit with us. Thank you!             Your Updated Medication List - Protect others around you: Learn how to safely use, store and throw away your medicines at www.disposemymeds.org.          This list is accurate as of: 6/5/17 11:06 AM.  Always use your most recent med list.                   Brand Name Dispense Instructions for use    escitalopram 20 MG tablet    LEXAPRO    90 tablet    Take 1 tablet (20 mg) by mouth daily       melatonin 3 MG tablet      Take 1 tablet (3 mg) by mouth nightly as needed for sleep       order for DME     1 each    Left knee brace       predniSONE 20 MG tablet     DELTASONE    10 tablet    Take 2 tablets (40 mg) by mouth daily for 5 days

## 2017-06-05 NOTE — LETTER
[address] [address]  Phone: [phone]  Fax: [phone]    June 5, 2017        Nabeel Beasley  [facility] [facility] [address]          To whom it may concern:    RE: Nabeel Beasley    Patient was seen and treated today at our clinic and missed work.  He can return to work 6/10/17.      Please contact me for questions or concerns.      Sincerely,        Amalia Weber PA-C

## 2017-06-05 NOTE — PROGRESS NOTES
SUBJECTIVE:                                                    Nabeel Beasley is a 36 year old male who presents to clinic today for the following health issues:      Musculoskeletal problem/pain      Duration: 1 day    Description  Location: left kne    Intensity:  moderate    Accompanying signs and symptoms: numbness and swelling    History  Previous similar problem: no   Previous evaluation:  none    Precipitating or alleviating factors:  Trauma or overuse: YES- Was playing football with his children when he dove for the football and landed on the knee. Per the patient at first the pain was up front due to an abrasion of the knee. Not a lot pf pain but as the night went on, he started to have more pain. Then this morning when he woke up he had a lot of stiffness and pain.  Aggravating factors include: standing, walking and climbing stairs    Therapies tried and outcome: rest/inactivity, ice and NSAID - IBU   Dove and landed on knee.  First pain was on the front of knee and now on inside of knee and numbness bottom of knee.    Swelling improved.    Works construction-needs to get back to work.        Problem list and histories reviewed & adjusted, as indicated.  Additional history: as documented    Patient Active Problem List   Diagnosis     Moderate episode of recurrent major depressive disorder (H)     Past Surgical History:   Procedure Laterality Date     NO HISTORY OF SURGERY         Social History   Substance Use Topics     Smoking status: Former Smoker     Quit date: 3/15/2008     Smokeless tobacco: Not on file     Alcohol use No     Family History   Problem Relation Age of Onset     Hyperlipidemia Mother      Migraines Mother      Hypertension Father      HEART DISEASE Father      Breast Cancer Maternal Grandmother      Bipolar Disorder Maternal Grandmother      CANCER Paternal Grandmother      Depression Paternal Grandfather      HEART DISEASE Paternal Grandfather      Hyperlipidemia Paternal  "Grandfather      Stomach Cancer Paternal Grandfather      Thyroid Cancer Brother            Reviewed and updated as needed this visit by clinical staff  Tobacco  Allergies  Meds  Med Hx  Surg Hx  Fam Hx  Soc Hx      Reviewed and updated as needed this visit by Provider  Allergies  Meds         ROS:  As above    OBJECTIVE:                                                    /83 (BP Location: Right arm, Patient Position: Chair, Cuff Size: Adult Regular)  Pulse 69  Temp 98  F (36.7  C) (Oral)  Ht 5' 11\" (1.803 m)  Wt 173 lb (78.5 kg)  BMI 24.13 kg/m2  Body mass index is 24.13 kg/(m^2).  GENERAL: healthy, alert and no distress  RESP: lungs clear to auscultation - no rales, rhonchi or wheezes  CV: regular rates and rhythm, normal S1 S2, no S3 or S4 and no murmur, click or rub  MS: normal rom of right knee, left knee limited rom with flexion, tender over left medial aspect of the distal femur, limited strength in left leg due to pain, negative anterior drawer test bilaterally and both knees crack with mcmurrays     Diagnostic Test Results:  none      ASSESSMENT/PLAN:                                                      1. Acute pain of left knee  Given mechanism of injury not likely any fracture.  Start with brace and prednisone for swelling.  See ortho if not getting better.    - predniSONE (DELTASONE) 20 MG tablet; Take 2 tablets (40 mg) by mouth daily for 5 days  Dispense: 10 tablet; Refill: 0  - order for DME; Left knee brace  Dispense: 1 each; Refill: 0  - ORTHO  REFERRAL        Amalia Weber PA-C  [facility]  "

## 2017-06-13 ENCOUNTER — OFFICE VISIT (OUTPATIENT)
Dept: ORTHOPEDICS | Facility: CLINIC | Age: 37
End: 2017-06-13
Payer: COMMERCIAL

## 2017-06-13 ENCOUNTER — RADIANT APPOINTMENT (OUTPATIENT)
Dept: GENERAL RADIOLOGY | Facility: CLINIC | Age: 37
End: 2017-06-13
Attending: PEDIATRICS
Payer: COMMERCIAL

## 2017-06-13 VITALS
WEIGHT: 174 LBS | DIASTOLIC BLOOD PRESSURE: 76 MMHG | HEIGHT: 71 IN | SYSTOLIC BLOOD PRESSURE: 128 MMHG | BODY MASS INDEX: 24.36 KG/M2

## 2017-06-13 DIAGNOSIS — S89.92XA LEFT KNEE INJURY, INITIAL ENCOUNTER: ICD-10-CM

## 2017-06-13 DIAGNOSIS — S89.92XA LEFT KNEE INJURY, INITIAL ENCOUNTER: Primary | ICD-10-CM

## 2017-06-13 DIAGNOSIS — M25.511 RIGHT SHOULDER PAIN, UNSPECIFIED CHRONICITY: ICD-10-CM

## 2017-06-13 DIAGNOSIS — S80.02XA CONTUSION OF LEFT KNEE, INITIAL ENCOUNTER: ICD-10-CM

## 2017-06-13 DIAGNOSIS — R22.30 MASS OF SHOULDER REGION: ICD-10-CM

## 2017-06-13 PROCEDURE — 99204 OFFICE O/P NEW MOD 45 MIN: CPT | Performed by: PEDIATRICS

## 2017-06-13 PROCEDURE — 73562 X-RAY EXAM OF KNEE 3: CPT | Mod: LT | Performed by: PEDIATRICS

## 2017-06-13 PROCEDURE — 73030 X-RAY EXAM OF SHOULDER: CPT | Mod: RT

## 2017-06-13 NOTE — Clinical Note
I had the opportunity to see Nabeel Beasley in [facility] clinic for his knee contusion and shoulder pain, likely impingement but also with anterior shoulder mass (likely lipoma). Will get additional imaging of the shoulder. Please see chart for details of the visit. Thanks.

## 2017-06-13 NOTE — PROGRESS NOTES
Sports Medicine Clinic Visit    PCP: Ana Vera DONITA Beasley is a 36 year old male who is seen  in consultation at the request of Amalia Weber PA-C presenting with a left knee injury.  Patient dove for a touchdown while playing football with his kids.  Landed directly on his left knee 6/3/17.  He was seen at urgent care and was given a knee brace.  He has continued to have pain over the medial aspect of his knee, but states he feels like his knee wants to hyperextend while walking.     **  Knee: Patient was able to get up and keep going after the incident.  After taking a break, patient was unable to keep going.  Movement worsens his pain.  Patient denies pain at rest. Noted some bruising and swelling.  When walking, knee feels like it will hyperextend.  Has been off of work because of his knee. Patient just got done with Prednisone regiment.     Right Shoulder: Patient has had a lump in his shoulder for years. Hurts to throw.  Patients pain is not in the lump but deep within the shoulder.  Does not recall clicking or popping with movement.      Patient throws right handed      Does also have right shoulder pain.  Pain with throwing.  Has been painful for a few years.  Does have a mass in the anterior aspect of his shoulder.      Injury: fall    Location of Pain: left knee, diffuse   Duration of Pain: 10 day(s)  Rating of Pain at worst: 2/10  Rating of Pain Currently: 0/10  Symptoms are better with: Rest and brace/sleeve  Symptoms are worse with: walking, stairs   Additional Features:   Positive: swelling, grinding and instability   Negative: bruising, popping, catching, locking, paresthesias, numbness, weakness, pain in other joints and systemic symptoms  Other evaluation and/or treatments so far consists of: Nothing  Prior History of related problems: denies     Social History: construction, saenz     Review of Systems  Musculoskeletal: as above  Remainder of review of systems is negative  "including constitutional, CV, pulmonary, GI, Skin and Neurologic except as noted in HPI or medical history.    This document serves as a record of the services and decisions personally performed and made by Prateek Zavala DO, CAQ. It was created on his behalf by Wang Ortez, a trained medical scribe. The creation of this document is based the provider's statements to the medical scribe.  Wang Ortez June 13, 2017 12:24 PM       Past Medical History:   Diagnosis Date     Bicuspid aortic valve      Past Surgical History:   Procedure Laterality Date     NO HISTORY OF SURGERY       Family History   Problem Relation Age of Onset     Hyperlipidemia Mother      Migraines Mother      Hypertension Father      HEART DISEASE Father      Breast Cancer Maternal Grandmother      Bipolar Disorder Maternal Grandmother      CANCER Paternal Grandmother      Depression Paternal Grandfather      HEART DISEASE Paternal Grandfather      Hyperlipidemia Paternal Grandfather      Stomach Cancer Paternal Grandfather      Thyroid Cancer Brother      Social History     Social History     Marital status:      Spouse name: N/A     Number of children: N/A     Years of education: N/A     Occupational History     Not on file.     Social History Main Topics     Smoking status: Former Smoker     Quit date: 3/15/2008     Smokeless tobacco: Not on file     Alcohol use No     Drug use: Yes     Special: Marijuana     Sexual activity: Yes     Partners: Female     Other Topics Concern     Parent/Sibling W/ Cabg, Mi Or Angioplasty Before 65f 55m? No     Social History Narrative       Objective  /76  Ht 5' 11\" (1.803 m)  Wt 174 lb (78.9 kg)  BMI 24.27 kg/m2    GENERAL APPEARANCE: healthy, alert and no distress   GAIT: antalgic  SKIN: no suspicious lesions or rashes  NEURO: Normal strength and tone, mentation intact and speech normal  PSYCH:  mentation appears normal and affect normal/bright  HEENT: no scleral icterus  CV: no " extremity edema   RESP: nonlabored breathing     Left Knee exam    ROM:        Flexion symmetric with tightness        Extension symmetric with tightness     Inspection:       no visible ecchymosis       no clear effusion       Soft tissue swelling anterior knee level patellar tendon, mild  Healing abrasion over anterior knee, over patellar tendon, tibial tubercle     Skin:       well perfused       capillary refill brisk    Patellar Motion:        Normal patellar tracking noted through range of motion       Pain with superior-inferior patellar translation     Tender:        medial joint line       infrapatellar tendon, tibial tubercle       Over medial retinaculum     Non Tender:         remainder of knee area    Special Tests:        neg (-) Gary       neg (-) Lachman       neg (-) anterior drawer       neg (-) posterior drawer       neg (-) varus, no laxity       neg (-) valgus, no laxity, pressure through superior knee       Mild reproduced pain with forced extension       Active leg raise, discomfort anterior knee        Right Shoulder exam    ROM:      Full active and passive ROM with flexion, extension, abduction and external rotation.       internal rotation mildly limited, 1-2 vertebral segments than left, + mild pain  Mild pain approaching/above shoulder level with flexion, abduction    Strength:      abduction 5/5       internal rotation 5/5       external rotation 5/5       adduction 5/5    Impingement testing:                  neg (-) empty can        neg (-) O'rahul   [place] with pain reproduced        Skin:      no visible deformities       well perfused       capillary refill brisk    Sensation:      normal sensation over shoulder and upper extremity     **  2.5cm x 2.5cm soft tissue mass, soft, non-tender, mobile, located anterior shoulder, feels subcutaneous     Radiology  Visualized radiographs of left knee obtained today, and reviewed the images with the patient.  Impression: Bilateral  Vasquez, bilateral axial, and left lateral views of the knees demonstrate no acute osseous abnormality. Joint spaces appear preserved.  Visualized radiographs of right shoulder obtained today, and reviewed the images with the patient.  Impression: no acute findings.    Recent Results (from the past 744 hour(s))   XR Shoulder Right G/E 3 Views    Narrative    XR SHOULDER RT G/E 3 VW 6/13/2017 12:36 PM     HISTORY: Pain in right shoulder      Impression    IMPRESSION: Negative exam.    REDD ROBISON MD         Assessment:  1. Left knee injury, initial encounter    2. Right shoulder pain, unspecified chronicity    3. Mass of anterior shoulder region    4. Contusion of left knee, initial encounter    left knee contusion with healing abrasion.  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  Right shoulder pain likely cuff impingement.    Plan:  Discussed the assessment with the patient.    Plain films of the area reviewed with the patient.     Knee:  We discussed the following treatment options: symptom treatment, activity modification/rest, imaging, medication and support for the affected area. Following discussion, plan:    Topical Treatments: Ice or Heat prn  Over the counter medication: Patient's preferred OTC medication as directed on packaging.  MRI of the left knee discussed; deferred. Anticipate improvement with time. We may reconsider if not improving with additional time.   Activity Modification: as discussed   Rehab: Home exercises were discussed  and the patient was given a home routine. Work on ROM, maintain quad function. May consider formal rehab pending course.  Medical Equipment: compression prn, patient owns. Consider padding at knee if desired with activities.  Work Restrictions letter provided; see letter. Off remainder of week.  Follow up: as needed. Anticipating improvement with additional time. If not improving next 2 weeks, notify clinic.  Questions answered. The patient indicates understanding  of these issues and agrees with the plan.        Shoulder:  We discussed the following treatment options: symptom treatment, activity modification/rest, imaging, rehab and referral to surgeon. Following discussion, plan:    Topical Treatments: Ice prn  Over the counter medication: Patient's preferred OTC medication as directed on packaging.  MRI of the right shoulder; he is interested related to ongoing shoulder pain (likely impingement), also with anterior shoulder mass. Plan MRI next.  Activity Modification: as discussed   Follow up: will call with results. Possible rehab pending results, related to shoulder pain. Also possible referral to surgery if he desires lipoma (presumed) excision.  Questions answered. The patient indicates understanding of these issues and agrees with the plan.    Prateek Zavala DO, CAQ    CC: Amalia Weber PA-C          Disclaimer: This note consists of symbols derived from keyboarding, dictation and/or voice recognition software. As a result, there may be errors in the script that have gone undetected. Please consider this when interpreting information found in this chart.    The information in this document, created by the medical scribe for me, accurately reflects the services I personally performed and the decisions made by me. I have reviewed and approved this document for accuracy.   Prateek Zavala DO, CAQ

## 2017-06-13 NOTE — LETTER
[facility] [place]  [address]  [place] [address]  Phone: [phone]  Fax: [phone]      June 13, 2017      RE: Nabeel Beasley  [address] [address]        To whom it may concern:    Nabeel Beasley is under my professional care. The employee is UNABLE to return to work until Monday, 6/19/17.      Sincerely,                Prateek GUO.

## 2017-06-13 NOTE — MR AVS SNAPSHOT
After Visit Summary   6/13/2017    Nabeel Beasley    MRN: [id number]           Patient Information     Date Of Birth          1980        Visit Information        Provider Department      6/13/2017 12:20 PM Prateek Zavala,  [facility] [place]        Today's Diagnoses     Left knee injury, initial encounter    -  1    Right shoulder pain, unspecified chronicity        Mass of anterior shoulder region          Care Instructions     Advanced imaging is done by appointment. Some insurance require a prior authorization to be completed which may delay the time until you are able to schedule your appointment.    Please call [place], [place] and [place]: [phone] to schedule your MRI.  Depending on your availability you can usually schedule within the next 1-2 days.    The clinic will call you with results, if you have not heard from the clinic within 3-4 days following your MRI please contact us at the number listed below.     If you have any further questions for your physician or physician s care team you can call [phone] and use option 3 to leave a voice message. Calls received during business hours will be returned same day.                  Follow-ups after your visit        Your next 10 appointments already scheduled     Jun 20, 2017  2:00 PM CDT   [facility] DO   [facility]    [address] [address]   [phone]            Jun 20, 2017  3:00 PM CDT   Return Visit with JULY Covarrubias   [facility]    [address] [address]   [phone]              Future tests that were ordered for you today     Open Future Orders        Priority Expected Expires Ordered    MR Shoulder Right w/o Contrast Routine  6/13/2018 6/13/2017            Who to contact     If you have questions or need follow up  "information about today's clinic visit or your schedule please contact [facility] directly at [phone].  Normal or non-critical lab and imaging results will be communicated to you by Findersfeehart, letter or phone within 4 business days after the clinic has received the results. If you do not hear from us within 7 days, please contact the clinic through Findersfeehart or phone. If you have a critical or abnormal lab result, we will notify you by phone as soon as possible.  Submit refill requests through Scali or call your pharmacy and they will forward the refill request to us. Please allow 3 business days for your refill to be completed.          Additional Information About Your Visit        FindersfeeharStormWind Information     Scali lets you send messages to your doctor, view your test results, renew your prescriptions, schedule appointments and more. To sign up, go to [email or web address] . Click on \"Log in\" on the left side of the screen, which will take you to the Welcome page. Then click on \"Sign up Now\" on the right side of the page.     You will be asked to enter the access code listed below, as well as some personal information. Please follow the directions to create your username and password.     Your access code is: [email or web address]  Expires: 2017  4:32 PM     Your access code will  in 90 days. If you need help or a new code, please call your [place] clinic or [phone].        Care EveryWhere ID     This is your Care EveryWhere ID. This could be used by other organizations to access your [place] medical records  [id number]        Your Vitals Were     Height BMI (Body Mass Index)                5' 11\" (1.803 m) 24.27 kg/m2           Blood Pressure from Last 3 Encounters:   17 128/76   17 123/83   17 132/68    Weight from Last 3 Encounters:   17 174 lb (78.9 kg)   17 173 lb (78.5 kg)   17 174 lb (78.9 kg)               Primary Care " Provider Office Phone # Fax #    Ana Vera [phone]       [facility] [address] [address]        Thank you!     Thank you for choosing [facility]  for your care. Our goal is always to provide you with excellent care. Hearing back from our patients is one way we can continue to improve our services. Please take a few minutes to complete the written survey that you may receive in the mail after your visit with us. Thank you!             Your Updated Medication List - Protect others around you: Learn how to safely use, store and throw away your medicines at www.disposemymeds.org.          This list is accurate as of: 6/13/17  1:08 PM.  Always use your most recent med list.                   Brand Name Dispense Instructions for use    escitalopram 20 MG tablet    LEXAPRO    90 tablet    Take 1 tablet (20 mg) by mouth daily       melatonin 3 MG tablet      Take 1 tablet (3 mg) by mouth nightly as needed for sleep       order for DME     1 each    Left knee brace

## 2017-06-13 NOTE — PATIENT INSTRUCTIONS
Advanced imaging is done by appointment. Some insurance require a prior authorization to be completed which may delay the time until you are able to schedule your appointment.    Please call [place], [place] and [place]: [phone] to schedule your MRI.  Depending on your availability you can usually schedule within the next 1-2 days.    The clinic will call you with results, if you have not heard from the clinic within 3-4 days following your MRI please contact us at the number listed below.     If you have any further questions for your physician or physician s care team you can call [phone] and use option 3 to leave a voice message. Calls received during business hours will be returned same day.

## 2017-06-20 ENCOUNTER — OFFICE VISIT (OUTPATIENT)
Dept: FAMILY MEDICINE | Facility: CLINIC | Age: 37
End: 2017-06-20
Payer: COMMERCIAL

## 2017-06-20 ENCOUNTER — OFFICE VISIT (OUTPATIENT)
Dept: BEHAVIORAL HEALTH | Facility: CLINIC | Age: 37
End: 2017-06-20
Payer: COMMERCIAL

## 2017-06-20 VITALS
BODY MASS INDEX: 24.08 KG/M2 | HEART RATE: 73 BPM | TEMPERATURE: 98.9 F | HEIGHT: 71 IN | WEIGHT: 172 LBS | RESPIRATION RATE: 16 BRPM | SYSTOLIC BLOOD PRESSURE: 126 MMHG | DIASTOLIC BLOOD PRESSURE: 83 MMHG

## 2017-06-20 DIAGNOSIS — F33.1 MODERATE EPISODE OF RECURRENT MAJOR DEPRESSIVE DISORDER (H): Primary | ICD-10-CM

## 2017-06-20 DIAGNOSIS — F33.1 MAJOR DEPRESSIVE DISORDER, RECURRENT EPISODE, MODERATE (H): Primary | ICD-10-CM

## 2017-06-20 DIAGNOSIS — Z23 ENCOUNTER FOR IMMUNIZATION: ICD-10-CM

## 2017-06-20 PROCEDURE — 90715 TDAP VACCINE 7 YRS/> IM: CPT | Performed by: INTERNAL MEDICINE

## 2017-06-20 PROCEDURE — 90832 PSYTX W PT 30 MINUTES: CPT | Performed by: SOCIAL WORKER

## 2017-06-20 PROCEDURE — 99213 OFFICE O/P EST LOW 20 MIN: CPT | Mod: 25 | Performed by: INTERNAL MEDICINE

## 2017-06-20 PROCEDURE — 90471 IMMUNIZATION ADMIN: CPT | Performed by: INTERNAL MEDICINE

## 2017-06-20 ASSESSMENT — PATIENT HEALTH QUESTIONNAIRE - PHQ9: 5. POOR APPETITE OR OVEREATING: NOT AT ALL

## 2017-06-20 ASSESSMENT — ANXIETY QUESTIONNAIRES
3. WORRYING TOO MUCH ABOUT DIFFERENT THINGS: SEVERAL DAYS
2. NOT BEING ABLE TO STOP OR CONTROL WORRYING: NOT AT ALL
6. BECOMING EASILY ANNOYED OR IRRITABLE: SEVERAL DAYS
GAD7 TOTAL SCORE: 3
IF YOU CHECKED OFF ANY PROBLEMS ON THIS QUESTIONNAIRE, HOW DIFFICULT HAVE THESE PROBLEMS MADE IT FOR YOU TO DO YOUR WORK, TAKE CARE OF THINGS AT HOME, OR GET ALONG WITH OTHER PEOPLE: NOT DIFFICULT AT ALL
7. FEELING AFRAID AS IF SOMETHING AWFUL MIGHT HAPPEN: NOT AT ALL
1. FEELING NERVOUS, ANXIOUS, OR ON EDGE: SEVERAL DAYS
5. BEING SO RESTLESS THAT IT IS HARD TO SIT STILL: NOT AT ALL

## 2017-06-20 NOTE — MR AVS SNAPSHOT
"              After Visit Summary   2017    Nabeel Beasley    MRN: [id number]           Patient Information     Date Of Birth          1980        Visit Information        Provider Department      2017 3:00 PM [facility]        Today's Diagnoses     Major depressive disorder, recurrent episode, moderate (H)    -  1       Follow-ups after your visit        Who to contact     If you have questions or need follow up information about today's clinic visit or your schedule please contact [facility] directly at [phone].  Normal or non-critical lab and imaging results will be communicated to you by Infopiahart, letter or phone within 4 business days after the clinic has received the results. If you do not hear from us within 7 days, please contact the clinic through Infopiahart or phone. If you have a critical or abnormal lab result, we will notify you by phone as soon as possible.  Submit refill requests through [employer] or call your pharmacy and they will forward the refill request to us. Please allow 3 business days for your refill to be completed.          Additional Information About Your Visit        MyChart Information     [employer] lets you send messages to your doctor, view your test results, renew your prescriptions, schedule appointments and more. To sign up, go to [email or web address] . Click on \"Log in\" on the left side of the screen, which will take you to the Welcome page. Then click on \"Sign up Now\" on the right side of the page.     You will be asked to enter the access code listed below, as well as some personal information. Please follow the directions to create your username and password.     Your access code is: [email or web address]  Expires: 2017  2:26 PM     Your access code will  in 90 days. If you need help or a new code, please call your [facility] or [phone].        Care EveryWhere ID     This is your Care " EveryWhere ID. This could be used by other organizations to access your [facility] medical records  [id number]         Blood Pressure from Last 3 Encounters:   06/20/17 126/83   06/13/17 128/76   06/05/17 123/83    Weight from Last 3 Encounters:   06/20/17 172 lb (78 kg)   06/13/17 174 lb (78.9 kg)   06/05/17 173 lb (78.5 kg)              Today, you had the following     No orders found for display       Primary Care Provider Office Phone # Fax #    Ana Vera,  [phone]       [facility] [address] [address]        Equal Access to Services     GRIFFIN ABDI : Hadii jeff carrollo Solona, waaxda luqadaha, qaybta kaalmada adeegyada, marium eller. So [facility] [phone].    ATENCIÓN: Si habla español, tiene a galeano disposición servicios gratuitos de asistencia lingüística. Llame al [phone].    We comply with applicable federal civil rights laws and [place] laws. We do not discriminate on the basis of race, color, national origin, age, disability sex, sexual orientation or gender identity.            Thank you!     Thank you for choosing [facility]  for your care. Our goal is always to provide you with excellent care. Hearing back from our patients is one way we can continue to improve our services. Please take a few minutes to complete the written survey that you may receive in the mail after your visit with us. Thank you!             Your Updated Medication List - Protect others around you: Learn how to safely use, store and throw away your medicines at www.disposemymeds.org.          This list is accurate as of: 6/20/17 11:59 PM.  Always use your most recent med list.                   Brand Name Dispense Instructions for use Diagnosis    escitalopram 20 MG tablet    LEXAPRO    90 tablet    Take 1 tablet (20 mg) by mouth daily    Moderate episode of recurrent major depressive disorder (H)       melatonin 3 MG  tablet      Take 1 tablet (3 mg) by mouth nightly as needed for sleep    Moderate episode of recurrent major depressive disorder (H)       order for DME     1 each    Left knee brace    Acute pain of left knee

## 2017-06-20 NOTE — PATIENT INSTRUCTIONS
1. You are doing much better.  Continue same dose of lexapro.  Recommend to continue this for 6-12 more months.  Safe to stop suddenly, but people often have mild side effects for a few days.  Typically recommend to taper off over a week.

## 2017-06-20 NOTE — LETTER
My Depression Action Plan  Name: Nabeel Beasley   Date of Birth 1980  Date: 6/20/2017    My doctor: Ana Vera   My clinic: [facility]  [address] [address] [phone]          GREEN    ZONE   Good Control    What it looks like:     Things are going generally well. You have normal up s and down s. You may even feel depressed from time to time, but bad moods usually last less than a day.   What you need to do:  1. Continue to care for yourself (see self care plan)  2. Check your depression survival kit and update it as needed  3. Follow your physician s recommendations including any medication.  4. Do not stop taking medication unless you consult with your physician first.           YELLOW         ZONE Getting Worse    What it looks like:     Depression is starting to interfere with your life.     It may be hard to get out of bed; you may be starting to isolate yourself from others.    Symptoms of depression are starting to last most all day and this has happened for several days.     You may have suicidal thoughts but they are not constant.   What you need to do:     1. Call your care team, your response to treatment will improve if you keep your care team informed of your progress. Yellow periods are signs an adjustment may need to be made.     2. Continue your self-care, even if you have to fake it!    3. Talk to someone in your support network    4. Open up your depression survival kit           RED    ZONE Medical Alert - Get Help    What it looks like:     Depression is seriously interfering with your life.     You may experience these or other symptoms: You can t get out of bed most days, can t work or engage in other necessary activities, you have trouble taking care of basic hygiene, or basic responsibilities, thoughts of suicide or death that will not go away, self-injurious behavior.     What you need to do:  1. Call your care team  and request a same-day appointment. If they are not available (weekends or after hours) call your local crisis line, emergency room or 911.      Electronically signed by: Ana Vera, June 20, 2017    Depression Self Care Plan / Survival Kit    Self-Care for Depression  Here s the deal. Your body and mind are really not as separate as most people think.  What you do and think affects how you feel and how you feel influences what you do and think. This means if you do things that people who feel good do, it will help you feel better.  Sometimes this is all it takes.  There is also a place for medication and therapy depending on how severe your depression is, so be sure to consult with your medical provider and/ or Behavioral Health Consultant if your symptoms are worsening or not improving.     In order to better manage my stress, I will:    Exercise  Get some form of exercise, every day. This will help reduce pain and release endorphins, the  feel good  chemicals in your brain. This is almost as good as taking antidepressants!  This is not the same as joining a gym and then never going! (they count on that by the way ) It can be as simple as just going for a walk or doing some gardening, anything that will get you moving.      Hygiene   Maintain good hygiene (Get out of bed in the morning, Make your bed, Brush your teeth, Take a shower, and Get dressed like you were going to work, even if you are unemployed).  If your clothes don't fit try to get ones that do.    Diet  I will strive to eat foods that are good for me, drink plenty of water, and avoid excessive sugar, caffeine, alcohol, and other mood-altering substances.  Some foods that are helpful in depression are: complex carbohydrates, B vitamins, flaxseed, fish or fish oil, fresh fruits and vegetables.    Psychotherapy  I agree to participate in Individual Therapy (if recommended).    Medication  If prescribed medications, I agree to take them.  Missing  doses can result in serious side effects.  I understand that drinking alcohol, or other illicit drug use, may cause potential side effects.  I will not stop my medication abruptly without first discussing it with my provider.    Staying Connected With Others  I will stay in touch with my friends, family members, and my primary care provider/team.    Use your imagination  Be creative.  We all have a creative side; it doesn t matter if it s oil painting, sand castles, or mud pies! This will also kick up the endorphins.    Witness Beauty  (AKA stop and smell the roses) Take a look outside, even in mid-winter. Notice colors, textures. Watch the squirrels and birds.     Service to others  Be of service to others.  There is always someone else in need.  By helping others we can  get out of ourselves  and remember the really important things.  This also provides opportunities for practicing all the other parts of the program.    Humor  Laugh and be silly!  Adjust your TV habits for less news and crime-drama and more comedy.    Control your stress  Try breathing deep, massage therapy, biofeedback, and meditation. Find time to relax each day.     My support system    Clinic Contact:  Phone number:    Contact 1:  Phone number:    Contact 2:  Phone number:    Confucianist/:  Phone number:    Therapist:  Phone number:    Local crisis center:    Phone number:    Other community support:  Phone number:

## 2017-06-20 NOTE — PROGRESS NOTES
"  SUBJECTIVE:                                                    Nabeel Beasley is a 36 year old male who presents to clinic today for the following health issues:      Depression Followup    Status since last visit: Stable     See PHQ-9 for current symptoms.  Other associated symptoms: panick attack    Complicating factors:   Significant life event:  No   Current substance abuse:  None  Anxiety or Panic symptoms:  Yes-  Light panic attack.  Patient was able to breath and calming exercises.    PHQ-9  English PHQ-9   Any Language            Amount of exercise or physical activity: 2-3 days/week for an average of greater than 60 minutes    Problems taking medications regularly: No    Medication side effects: none    Diet: regular (no restrictions) eat a lot of protein; hardly eats fast food.    Last seen 4/12 and lexapro increased from 10-20 mg.    PHQ 10 3/15 10, today is 5    He feels things are definitely improving.  Anger and attitude have improved.  Denies side effects.    Is sleeping much better.  Still using melatonin most nights.    Is following with Margaret and feels this has helped significantly.  Feels he has 'graduated'        Current Outpatient Prescriptions   Medication Sig Dispense Refill     escitalopram (LEXAPRO) 20 MG tablet Take 1 tablet (20 mg) by mouth daily 90 tablet 3     melatonin 3 MG tablet Take 1 tablet (3 mg) by mouth nightly as needed for sleep       order for DME Left knee brace 1 each 0       Reviewed and updated as needed this visit by clinical staff  Allergies  Meds  Problems       Reviewed and updated as needed this visit by Provider  Allergies  Meds  Problems         ROS:  Constitutional, HEENT, cardiovascular, pulmonary, gi and gu systems are negative, except as otherwise noted.    OBJECTIVE:                                                    /83  Pulse 73  Temp 98.9  F (37.2  C) (Tympanic)  Resp 16  Ht 5' 11\" (1.803 m)  Wt 172 lb (78 kg)  BMI 23.99 kg/m2  Body " mass index is 23.99 kg/(m^2).  GENERAL APPEARANCE: healthy, alert and no distress  PSYCH: mentation appears normal and affect normal/bright       ASSESSMENT/PLAN:                                                    1. Moderate episode of recurrent major depressive disorder (H) - much improved. Continue med for another 6-12 months.  Taper off when patient ready.  Return PRN  - DEPRESSION ACTION PLAN (DAP)    2. Encounter for immunization  - TDAP VACCINE (ADACEL)  - ADMIN 1st VACCINE        Ana Vera,   [facility]

## 2017-06-20 NOTE — MR AVS SNAPSHOT
"              After Visit Summary   6/20/2017    Nabeel Beasley    MRN: [id number]           Patient Information     Date Of Birth          1980        Visit Information        Provider Department      6/20/2017 2:00 PM Ana Vera, DO [facility]        Today's Diagnoses     Moderate episode of recurrent major depressive disorder (H)    -  1      Care Instructions    1. You are doing much better.  Continue same dose of lexapro.  Recommend to continue this for 6-12 more months.  Safe to stop suddenly, but people often have mild side effects for a few days.  Typically recommend to taper off over a week.            Follow-ups after your visit        Your next 10 appointments already scheduled     Jun 20, 2017  3:00 PM CDT   Return Visit with JULY Covarrubias   [facility]    [address] [address] [phone]              Who to contact     If you have questions or need follow up information about today's clinic visit or your schedule please contact [facility] directly at [phone].  Normal or non-critical lab and imaging results will be communicated to you by Meiyouhart, letter or phone within 4 business days after the clinic has received the results. If you do not hear from us within 7 days, please contact the clinic through [employer] or phone. If you have a critical or abnormal lab result, we will notify you by phone as soon as possible.  Submit refill requests through [employer] or call your pharmacy and they will forward the refill request to us. Please allow 3 business days for your refill to be completed.          Additional Information About Your Visit        MyChart Information     [employer] lets you send messages to your doctor, view your test results, renew your prescriptions, schedule appointments and more. To sign up, go to [email or web address] . Click on \"Log in\" on the left side of the " "screen, which will take you to the Welcome page. Then click on \"Sign up Now\" on the right side of the page.     You will be asked to enter the access code listed below, as well as some personal information. Please follow the directions to create your username and password.     Your access code is: [email or web address]  Expires: 2017  2:26 PM     Your access code will  in 90 days. If you need help or a new code, please call your [place] clinic or [phone].        Care EveryWhere ID     This is your Care EveryWhere ID. This could be used by other organizations to access your [place] medical records  [id number]        Your Vitals Were     Pulse Temperature Respirations Height BMI (Body Mass Index)       73 98.9  F (37.2  C) (Tympanic) 16 5' 11\" (1.803 m) 23.99 kg/m2        Blood Pressure from Last 3 Encounters:   17 126/83   17 128/76   17 123/83    Weight from Last 3 Encounters:   17 172 lb (78 kg)   17 174 lb (78.9 kg)   17 173 lb (78.5 kg)              We Performed the Following     DEPRESSION ACTION PLAN (DAP)        Primary Care Provider Office Phone # Fax #    Ana Vera [phone]       [facility] [address] [place] [id number]        Thank you!     Thank you for choosing [facility]  for your care. Our goal is always to provide you with excellent care. Hearing back from our patients is one way we can continue to improve our services. Please take a few minutes to complete the written survey that you may receive in the mail after your visit with us. Thank you!             Your Updated Medication List - Protect others around you: Learn how to safely use, store and throw away your medicines at www.disposemymeds.org.          This list is accurate as of: 17  2:26 PM.  Always use your most recent med list.                   Brand Name Dispense Instructions for use    escitalopram 20 MG tablet    LEXAPRO    " 90 tablet    Take 1 tablet (20 mg) by mouth daily       melatonin 3 MG tablet      Take 1 tablet (3 mg) by mouth nightly as needed for sleep       order for DME     1 each    Left knee brace

## 2017-06-21 ASSESSMENT — PATIENT HEALTH QUESTIONNAIRE - PHQ9: SUM OF ALL RESPONSES TO PHQ QUESTIONS 1-9: 5

## 2017-06-21 ASSESSMENT — ANXIETY QUESTIONNAIRES: GAD7 TOTAL SCORE: 3

## 2017-06-21 NOTE — PROGRESS NOTES
[facility]  June 20, 2017      Behavioral Health Clinician Progress Note    Patient Name: Nabeel Beasley           Service Type: Individual      Service Location:  in clinic      Session Start Time:305 pm  Session End Time: 330 pm      Session Length: 38 - 52      Attendees: Client    Visit Activities (Refresh list every visit): [facility] Only    Diagnostic Assessment Date: 3-  Treatment Plan Review Date 7/12/2017 -   See Flowsheets for today's PHQ-9 and TETE-7 results  Previous PHQ-9:   PHQ-9 SCORE 3/22/2017 4/12/2017 6/20/2017   Total Score 10 10 5     Previous TETE-7:   TETE-7 SCORE 3/22/2017 4/12/2017 6/20/2017   Total Score 8 6 3       LEILA LEVEL:  LEILA Score (Last Two) 3/15/2017   LEILA Raw Score 21   Activation Score 35.4   LEILA Level 1       DATA  Extended Session (60+ minutes): No  Interactive Complexity: No  Crisis: No    Treatment Objective(s) Addressed in This Session:  Target Behavior(s): disease management/lifestyle changes decrease anxious response to events, decrease depressed mood de    Depressed Mood: Increase interest, engagement, and pleasure in doing things  Decrease frequency and intensity of feeling down, depressed, hopeless  Feel less tired and more energy during the day   Identify negative self-talk and behaviors: challenge core beliefs, myths, and actions  Improve concentration, focus, and mindfulness in daily activities   Adjustment Difficulties: will develop coping/problem-solving skills to facilitate more adaptive adjustment  Relationship Problems: will address relationship difficulties in a more adaptive manner  Grief / Loss: will increase understanding of normal grieving process, will engage in effective approach to address and resolve grief/loss issues and will process grief/loss issues in an adaptive manner    Current Stressors / Issues:  Pt reports symptoms have decreased along with ruminations.  He continues to practice self-care - eating healthy, being  around supportive people and taking medications as directed.  He will see this writer in 1 month or earlier as needed.     Progress on Treatment Objective(s) / Homework:  Satisfactory progress - ACTION (Actively working towards change); Intervened by reinforcing change plan / affirming steps taken    Motivational Interviewing    MI Intervention: Expressed Empathy/Understanding, Supported Autonomy, Collaboration, Evocation, Open-ended questions, Reflections: simple and complex and Reframe     Change Talk Expressed by the Patient: Desire to change Ability to change Reasons to change Need to change Committment to change    Provider Response to Change Talk: E - Evoked more info from patient about behavior change, A - Affirmed patient's thoughts, decisions, or attempts at behavior change, R - Reflected patient's change talk and S - Summarized patient's change talk statements      Care Plan review completed:Guests   Medication Review:  No changes to current psychiatric medication(s)    Medication Compliance:  Yes    Changes in Health Issues:   None reported    Chemical Use Review:   Substance Use: Chemical use reviewed, no active concerns identified      Tobacco Use: No current tobacco use.      Assessment: Current Emotional / Mental Status (status of significant symptoms):  Risk status (Self / Other harm or suicidal ideation)  Patient denies a history of suicidal ideation, suicide attempts, self-injurious behavior, homicidal ideation, homicidal behavior and and other safety concerns  Patient denies current fears or concerns for personal safety.  Patient denies current or recent suicidal ideation or behaviors.  Patient denies current or recent homicidal ideation or behaviors.  Patient denies current or recent self injurious behavior or ideation.  Patient denies other safety concerns.  A safety and risk management plan has not been developed at this time, however patient was encouraged to call [facility] should  there be a change in any of these risk factors.    Appearance:   Appropriate   Eye Contact:   Good   Psychomotor Behavior: Normal   Attitude:   Cooperative   Orientation:   All  Speech   Rate / Production: Normal    Volume:  Normal   Mood:    Normal appropriate to events  Affect:    Appropriate    Thought Content:  Clear   Thought Form:  Coherent  Logical   Insight:    Good     Diagnoses:    1. Major depressive disorder, recurrent episode, moderate (H)        Collateral Reports Completed:  Communicated with: PCP as needed.     Plan: (Homework, other):  Patient was encouraged to schedule a follow up appointment with the clinic [facility] in 1 month or earlier if needed.  He was also given deep Breathing Strategies to practice when experiencing anxiety and depression.  CD Recommendations: No indications of CD issues. [facility], [facility], [facility]        ______________________________________________________________________    Integrated Primary Care Behavioral Health Treatment Plan    Patient's Name: Nabeel Beasley  YOB: 1980    Date: April 12, 2017    DSM-V Diagnoses: 296.32 Major Depressive Disorder, Recurrent Episode, Moderate _ and With melancholic features or Adjustment Disorders  309.24 (F43.22) With anxiety  Psychosocial / Contextual Factors:  Recent separation from spouse   WHODAS: 31    Referral / Collaboration:  The following referral(s) was/were discussed but client declines follow up at this time. Continue to assess as needed.    Anticipated number of session or this episode of care: 5-8  MeasurableTreatment Goal(s) related to diagnosis / functional impairment(s)  Goal 1: Patient will decrease anxiety and depression by 50% as indicated by PHQ9, TETE 7 and self report.    I will know I've met my goal when I'm moving on with my life.      Objective #A (Patient Action)    Patient will identify 3 stressors which contribute to feelings of anxiety.  Status: New - Date: 4/12/2017      Intervention(s)  [facility] will assign homework As discussed in session  teach distraction skills. Along with relaxation skills.    Objective #B  Patient will Increase interest, engagement, and pleasure in doing things  Decrease frequency and intensity of feeling down, depressed, hopeless  Feel less tired and more energy during the day   Identify negative self-talk and behaviors: challenge core beliefs, myths, and actions  Improve concentration, focus, and mindfulness in daily activities .  Status: New - Date: 4/12/2017     Intervention(s)  [facility] will assign homework As discussed in session  teach emotional regulation skills. Recognizes thoughts and events that lead to depressed thoughts and mood.    Patient has reviewed and agreed to the above plan.    Written by  JULY Covarrubias, [facility]

## 2017-09-08 ENCOUNTER — TELEPHONE (OUTPATIENT)
Dept: FAMILY MEDICINE | Facility: CLINIC | Age: 37
End: 2017-09-08

## 2017-09-08 NOTE — LETTER
October 3, 2017      Nabeel Beasley  [address] [address]        Dear Nabeel,     We have been unable to reach you by phone.    Our records indicate you are due to update the depression screening questionnaire.   This tool helps us to assess your symptoms and treatment plan.  Please complete the enclosed form and return in the provided envelope.    Thank you for trusting us with your health care.      Sincerely,        Your [facility] Team/yoli

## 2017-09-08 NOTE — TELEPHONE ENCOUNTER
PHQ9 Due by:10/15/17    Please contact patient to complete follow up PHQ9 before their DUE DATE.     This is important feedback for your care team to monitor how you are doing while taking Lexapro.     You completed this same questionnaire   PHQ-9 SCORE 6/20/2017   Total Score 5       MA STAFF: If upon calling patient and PHQ9 score is higher that 10 route to the provider. You may also seek an RN for review.

## 2017-10-03 NOTE — TELEPHONE ENCOUNTER
Panel Management Review      Patient has the following on his problem list:     Depression / Dysthymia review    Measure:  Needs PHQ-9 score of 4 or less during index window.  Administer PHQ-9 and if score is 5 or more, send encounter to provider for next steps.    5 - 7 month window range:     PHQ-9 SCORE 3/22/2017 4/12/2017 6/20/2017   Total Score 10 10 5       If PHQ-9 recheck is 5 or more, route to provider for next steps.    Patient is due for:  PHQ9        Composite cancer screening  Chart review shows that this patient is due/due soon for the following None  Summary:    Patient is due/failing the following:   PHQ9    Action needed:   Patient needs to do PHQ9.    Type of outreach:    No response to phone calls, letter with phq9 mailed for patient to complete and return.    Questions for provider review:    None                                                                                                                                    LAN Ramirez, CMA

## 2025-05-22 NOTE — NURSING NOTE
"Chief Complaint   Patient presents with     Depression     Recheck.  Phq9/Jori completed today.  Patient recently finished prednisone for left knee injury.  Wondering if this medication interacts with Lexapro.       Initial /83  Pulse 73  Temp 98.9  F (37.2  C) (Tympanic)  Resp 16  Ht 5' 11\" (1.803 m)  Wt 172 lb (78 kg)  BMI 23.99 kg/m2 Estimated body mass index is 23.99 kg/(m^2) as calculated from the following:    Height as of this encounter: 5' 11\" (1.803 m).    Weight as of this encounter: 172 lb (78 kg).    Medication Reconciliation:  complete    Bhavana Gomez CMA (AAMA)  "
Detail Level: Zone